# Patient Record
Sex: MALE | Race: WHITE | Employment: FULL TIME | URBAN - METROPOLITAN AREA
[De-identification: names, ages, dates, MRNs, and addresses within clinical notes are randomized per-mention and may not be internally consistent; named-entity substitution may affect disease eponyms.]

---

## 2020-05-11 ENCOUNTER — APPOINTMENT (OUTPATIENT)
Dept: GENERAL RADIOLOGY | Age: 50
End: 2020-05-11
Payer: MEDICAID

## 2020-05-11 ENCOUNTER — HOSPITAL ENCOUNTER (OUTPATIENT)
Age: 50
Setting detail: OBSERVATION
Discharge: HOME | End: 2020-05-12
Attending: EMERGENCY MEDICINE | Admitting: SURGERY
Payer: MEDICAID

## 2020-05-11 ENCOUNTER — APPOINTMENT (OUTPATIENT)
Dept: CT IMAGING | Age: 50
End: 2020-05-11
Payer: MEDICAID

## 2020-05-11 PROBLEM — S22.42XA CLOSED FRACTURE OF THREE RIBS, LEFT, INITIAL ENCOUNTER: Status: ACTIVE | Noted: 2020-05-11

## 2020-05-11 LAB
ABO/RH: NORMAL
ACETAMINOPHEN LEVEL: <5 MCG/ML (ref 10–30)
ALBUMIN SERPL-MCNC: 4.1 G/DL (ref 3.5–5.2)
ALP BLD-CCNC: 70 U/L (ref 40–129)
ALT SERPL-CCNC: 37 U/L (ref 0–40)
ANION GAP SERPL CALCULATED.3IONS-SCNC: 13 MMOL/L (ref 7–16)
ANTIBODY SCREEN: NORMAL
APTT: 28.9 SEC (ref 24.5–35.1)
AST SERPL-CCNC: 31 U/L (ref 0–39)
B.E.: -2.7 MMOL/L (ref -3–3)
BILIRUB SERPL-MCNC: 0.8 MG/DL (ref 0–1.2)
BUN BLDV-MCNC: 16 MG/DL (ref 6–20)
CALCIUM SERPL-MCNC: 9.6 MG/DL (ref 8.6–10.2)
CHLORIDE BLD-SCNC: 104 MMOL/L (ref 98–107)
CO2: 24 MMOL/L (ref 22–29)
COHB: 1 % (ref 0–1.5)
CREAT SERPL-MCNC: 1 MG/DL (ref 0.7–1.2)
CRITICAL: ABNORMAL
DATE ANALYZED: ABNORMAL
DATE OF COLLECTION: ABNORMAL
ETHANOL: <10 MG/DL (ref 0–0.08)
GFR AFRICAN AMERICAN: >60
GFR NON-AFRICAN AMERICAN: >60 ML/MIN/1.73
GLUCOSE BLD-MCNC: 125 MG/DL (ref 74–99)
HCO3: 23 MMOL/L (ref 22–26)
HCT VFR BLD CALC: 43.2 % (ref 37–54)
HEMOGLOBIN: 14.8 G/DL (ref 12.5–16.5)
HHB: 0.6 % (ref 0–5)
INR BLD: 1
LAB: ABNORMAL
LACTIC ACID: 1.3 MMOL/L (ref 0.5–2.2)
Lab: ABNORMAL
MCH RBC QN AUTO: 31.8 PG (ref 26–35)
MCHC RBC AUTO-ENTMCNC: 34.3 % (ref 32–34.5)
MCV RBC AUTO: 92.7 FL (ref 80–99.9)
METHB: 0.3 % (ref 0–1.5)
MODE: ABNORMAL
O2 CONTENT: 22.2 ML/DL
O2 SATURATION: 99.4 % (ref 92–98.5)
O2HB: 98.1 % (ref 94–97)
OPERATOR ID: 925
PATIENT TEMP: 37 C
PCO2: 42.9 MMHG (ref 35–45)
PDW BLD-RTO: 12.1 FL (ref 11.5–15)
PH BLOOD GAS: 7.35 (ref 7.35–7.45)
PLATELET # BLD: 243 E9/L (ref 130–450)
PMV BLD AUTO: 9.8 FL (ref 7–12)
PO2: 306.8 MMHG (ref 75–100)
POTASSIUM SERPL-SCNC: 4.12 MMOL/L (ref 3.5–5)
POTASSIUM SERPL-SCNC: 4.3 MMOL/L (ref 3.5–5)
PROTHROMBIN TIME: 11.1 SEC (ref 9.3–12.4)
RBC # BLD: 4.66 E12/L (ref 3.8–5.8)
SALICYLATE, SERUM: <0.3 MG/DL (ref 0–30)
SODIUM BLD-SCNC: 141 MMOL/L (ref 132–146)
SOURCE, BLOOD GAS: ABNORMAL
THB: 15.6 G/DL (ref 11.5–16.5)
TIME ANALYZED: 1318
TOTAL PROTEIN: 6.8 G/DL (ref 6.4–8.3)
TRICYCLIC ANTIDEPRESSANTS SCREEN SERUM: NEGATIVE NG/ML
WBC # BLD: 7.2 E9/L (ref 4.5–11.5)

## 2020-05-11 PROCEDURE — 6360000004 HC RX CONTRAST MEDICATION: Performed by: RADIOLOGY

## 2020-05-11 PROCEDURE — G0378 HOSPITAL OBSERVATION PER HR: HCPCS

## 2020-05-11 PROCEDURE — 2580000003 HC RX 258: Performed by: STUDENT IN AN ORGANIZED HEALTH CARE EDUCATION/TRAINING PROGRAM

## 2020-05-11 PROCEDURE — 85730 THROMBOPLASTIN TIME PARTIAL: CPT

## 2020-05-11 PROCEDURE — G0390 TRAUMA RESPONS W/HOSP CRITI: HCPCS

## 2020-05-11 PROCEDURE — 6360000002 HC RX W HCPCS: Performed by: STUDENT IN AN ORGANIZED HEALTH CARE EDUCATION/TRAINING PROGRAM

## 2020-05-11 PROCEDURE — 70496 CT ANGIOGRAPHY HEAD: CPT

## 2020-05-11 PROCEDURE — 85027 COMPLETE CBC AUTOMATED: CPT

## 2020-05-11 PROCEDURE — 86900 BLOOD TYPING SEROLOGIC ABO: CPT

## 2020-05-11 PROCEDURE — 80307 DRUG TEST PRSMV CHEM ANLYZR: CPT

## 2020-05-11 PROCEDURE — 2580000003 HC RX 258: Performed by: RADIOLOGY

## 2020-05-11 PROCEDURE — 80053 COMPREHEN METABOLIC PANEL: CPT

## 2020-05-11 PROCEDURE — 71045 X-RAY EXAM CHEST 1 VIEW: CPT

## 2020-05-11 PROCEDURE — 83605 ASSAY OF LACTIC ACID: CPT

## 2020-05-11 PROCEDURE — G0480 DRUG TEST DEF 1-7 CLASSES: HCPCS

## 2020-05-11 PROCEDURE — 36415 COLL VENOUS BLD VENIPUNCTURE: CPT

## 2020-05-11 PROCEDURE — 82805 BLOOD GASES W/O2 SATURATION: CPT

## 2020-05-11 PROCEDURE — 70450 CT HEAD/BRAIN W/O DYE: CPT

## 2020-05-11 PROCEDURE — 6370000000 HC RX 637 (ALT 250 FOR IP): Performed by: STUDENT IN AN ORGANIZED HEALTH CARE EDUCATION/TRAINING PROGRAM

## 2020-05-11 PROCEDURE — 86850 RBC ANTIBODY SCREEN: CPT

## 2020-05-11 PROCEDURE — 74177 CT ABD & PELVIS W/CONTRAST: CPT

## 2020-05-11 PROCEDURE — 71260 CT THORAX DX C+: CPT

## 2020-05-11 PROCEDURE — 86901 BLOOD TYPING SEROLOGIC RH(D): CPT

## 2020-05-11 PROCEDURE — 6360000002 HC RX W HCPCS: Performed by: SURGERY

## 2020-05-11 PROCEDURE — 85610 PROTHROMBIN TIME: CPT

## 2020-05-11 PROCEDURE — 96375 TX/PRO/DX INJ NEW DRUG ADDON: CPT

## 2020-05-11 PROCEDURE — 99219 PR INITIAL OBSERVATION CARE/DAY 50 MINUTES: CPT | Performed by: SURGERY

## 2020-05-11 PROCEDURE — 96376 TX/PRO/DX INJ SAME DRUG ADON: CPT

## 2020-05-11 PROCEDURE — 73030 X-RAY EXAM OF SHOULDER: CPT

## 2020-05-11 PROCEDURE — 72125 CT NECK SPINE W/O DYE: CPT

## 2020-05-11 PROCEDURE — 96374 THER/PROPH/DIAG INJ IV PUSH: CPT

## 2020-05-11 PROCEDURE — 6810039000 HC L1 TRAUMA ALERT

## 2020-05-11 PROCEDURE — 70498 CT ANGIOGRAPHY NECK: CPT

## 2020-05-11 PROCEDURE — 6370000000 HC RX 637 (ALT 250 FOR IP): Performed by: SURGERY

## 2020-05-11 PROCEDURE — 99291 CRITICAL CARE FIRST HOUR: CPT

## 2020-05-11 PROCEDURE — 72170 X-RAY EXAM OF PELVIS: CPT

## 2020-05-11 PROCEDURE — 84132 ASSAY OF SERUM POTASSIUM: CPT

## 2020-05-11 RX ORDER — PROMETHAZINE HYDROCHLORIDE 25 MG/1
12.5 TABLET ORAL EVERY 6 HOURS PRN
Status: DISCONTINUED | OUTPATIENT
Start: 2020-05-11 | End: 2020-05-12 | Stop reason: HOSPADM

## 2020-05-11 RX ORDER — KETOROLAC TROMETHAMINE 30 MG/ML
15 INJECTION, SOLUTION INTRAMUSCULAR; INTRAVENOUS EVERY 6 HOURS
Status: DISCONTINUED | OUTPATIENT
Start: 2020-05-11 | End: 2020-05-12 | Stop reason: HOSPADM

## 2020-05-11 RX ORDER — SODIUM CHLORIDE 0.9 % (FLUSH) 0.9 %
10 SYRINGE (ML) INJECTION PRN
Status: DISCONTINUED | OUTPATIENT
Start: 2020-05-11 | End: 2020-05-12 | Stop reason: HOSPADM

## 2020-05-11 RX ORDER — SODIUM CHLORIDE 9 MG/ML
INJECTION, SOLUTION INTRAVENOUS CONTINUOUS
Status: DISCONTINUED | OUTPATIENT
Start: 2020-05-11 | End: 2020-05-12

## 2020-05-11 RX ORDER — SODIUM CHLORIDE 0.9 % (FLUSH) 0.9 %
10 SYRINGE (ML) INJECTION EVERY 12 HOURS SCHEDULED
Status: DISCONTINUED | OUTPATIENT
Start: 2020-05-11 | End: 2020-05-12 | Stop reason: HOSPADM

## 2020-05-11 RX ORDER — POLYETHYLENE GLYCOL 3350 17 G/17G
17 POWDER, FOR SOLUTION ORAL DAILY
Status: DISCONTINUED | OUTPATIENT
Start: 2020-05-11 | End: 2020-05-12 | Stop reason: HOSPADM

## 2020-05-11 RX ORDER — LIDOCAINE 4 G/G
1 PATCH TOPICAL DAILY
Status: DISCONTINUED | OUTPATIENT
Start: 2020-05-11 | End: 2020-05-12 | Stop reason: HOSPADM

## 2020-05-11 RX ORDER — METHOCARBAMOL 500 MG/1
1500 TABLET, FILM COATED ORAL 4 TIMES DAILY
Status: DISCONTINUED | OUTPATIENT
Start: 2020-05-11 | End: 2020-05-12 | Stop reason: HOSPADM

## 2020-05-11 RX ORDER — ACETAMINOPHEN 325 MG/1
650 TABLET ORAL EVERY 6 HOURS
Status: DISCONTINUED | OUTPATIENT
Start: 2020-05-11 | End: 2020-05-12 | Stop reason: HOSPADM

## 2020-05-11 RX ORDER — SODIUM CHLORIDE 0.9 % (FLUSH) 0.9 %
10 SYRINGE (ML) INJECTION ONCE
Status: COMPLETED | OUTPATIENT
Start: 2020-05-11 | End: 2020-05-11

## 2020-05-11 RX ORDER — MORPHINE SULFATE 2 MG/ML
2 INJECTION, SOLUTION INTRAMUSCULAR; INTRAVENOUS
Status: DISCONTINUED | OUTPATIENT
Start: 2020-05-11 | End: 2020-05-11

## 2020-05-11 RX ORDER — OXYCODONE HYDROCHLORIDE 5 MG/1
5 TABLET ORAL EVERY 6 HOURS PRN
Status: DISCONTINUED | OUTPATIENT
Start: 2020-05-11 | End: 2020-05-12 | Stop reason: HOSPADM

## 2020-05-11 RX ORDER — ONDANSETRON 2 MG/ML
4 INJECTION INTRAMUSCULAR; INTRAVENOUS EVERY 6 HOURS PRN
Status: DISCONTINUED | OUTPATIENT
Start: 2020-05-11 | End: 2020-05-12 | Stop reason: HOSPADM

## 2020-05-11 RX ORDER — SENNA AND DOCUSATE SODIUM 50; 8.6 MG/1; MG/1
1 TABLET, FILM COATED ORAL 2 TIMES DAILY
Status: DISCONTINUED | OUTPATIENT
Start: 2020-05-11 | End: 2020-05-12 | Stop reason: HOSPADM

## 2020-05-11 RX ORDER — FENTANYL CITRATE 50 UG/ML
INJECTION, SOLUTION INTRAMUSCULAR; INTRAVENOUS DAILY PRN
Status: COMPLETED | OUTPATIENT
Start: 2020-05-11 | End: 2020-05-11

## 2020-05-11 RX ORDER — ONDANSETRON 2 MG/ML
4 INJECTION INTRAMUSCULAR; INTRAVENOUS EVERY 6 HOURS PRN
Status: DISCONTINUED | OUTPATIENT
Start: 2020-05-11 | End: 2020-05-11 | Stop reason: HOSPADM

## 2020-05-11 RX ORDER — ACETAMINOPHEN 325 MG/1
650 TABLET ORAL EVERY 4 HOURS PRN
Status: DISCONTINUED | OUTPATIENT
Start: 2020-05-11 | End: 2020-05-12 | Stop reason: HOSPADM

## 2020-05-11 RX ORDER — SODIUM CHLORIDE 0.9 % (FLUSH) 0.9 %
10 SYRINGE (ML) INJECTION ONCE
Status: DISCONTINUED | OUTPATIENT
Start: 2020-05-11 | End: 2020-05-11 | Stop reason: ALTCHOICE

## 2020-05-11 RX ADMIN — ACETAMINOPHEN 650 MG: 325 TABLET, FILM COATED ORAL at 14:01

## 2020-05-11 RX ADMIN — OXYCODONE 5 MG: 5 TABLET ORAL at 15:19

## 2020-05-11 RX ADMIN — FENTANYL CITRATE 50 MCG: 50 INJECTION, SOLUTION INTRAMUSCULAR; INTRAVENOUS at 13:16

## 2020-05-11 RX ADMIN — SODIUM CHLORIDE: 9 INJECTION, SOLUTION INTRAVENOUS at 19:14

## 2020-05-11 RX ADMIN — IOPAMIDOL 70 ML: 755 INJECTION, SOLUTION INTRAVENOUS at 15:56

## 2020-05-11 RX ADMIN — SODIUM CHLORIDE: 9 INJECTION, SOLUTION INTRAVENOUS at 14:00

## 2020-05-11 RX ADMIN — METHOCARBAMOL TABLETS 1500 MG: 500 TABLET, COATED ORAL at 20:01

## 2020-05-11 RX ADMIN — ACETAMINOPHEN 650 MG: 325 TABLET, FILM COATED ORAL at 20:01

## 2020-05-11 RX ADMIN — KETOROLAC TROMETHAMINE 15 MG: 30 INJECTION, SOLUTION INTRAMUSCULAR; INTRAVENOUS at 14:00

## 2020-05-11 RX ADMIN — Medication 10 ML: at 15:57

## 2020-05-11 RX ADMIN — METHOCARBAMOL TABLETS 1500 MG: 500 TABLET, COATED ORAL at 15:20

## 2020-05-11 RX ADMIN — IOPAMIDOL 100 ML: 755 INJECTION, SOLUTION INTRAVENOUS at 13:46

## 2020-05-11 RX ADMIN — KETOROLAC TROMETHAMINE 15 MG: 30 INJECTION, SOLUTION INTRAMUSCULAR; INTRAVENOUS at 19:14

## 2020-05-11 ASSESSMENT — PAIN DESCRIPTION - LOCATION
LOCATION: RIB CAGE
LOCATION: RIB CAGE

## 2020-05-11 ASSESSMENT — PAIN SCALES - GENERAL
PAINLEVEL_OUTOF10: 8
PAINLEVEL_OUTOF10: 10
PAINLEVEL_OUTOF10: 6
PAINLEVEL_OUTOF10: 5
PAINLEVEL_OUTOF10: 0

## 2020-05-11 ASSESSMENT — PAIN DESCRIPTION - PAIN TYPE
TYPE: ACUTE PAIN
TYPE: ACUTE PAIN

## 2020-05-11 ASSESSMENT — PAIN DESCRIPTION - DESCRIPTORS
DESCRIPTORS: ACHING;DISCOMFORT;SHARP
DESCRIPTORS: ACHING;DISCOMFORT;SHARP

## 2020-05-11 ASSESSMENT — PAIN DESCRIPTION - FREQUENCY
FREQUENCY: CONTINUOUS
FREQUENCY: CONTINUOUS

## 2020-05-11 ASSESSMENT — PAIN DESCRIPTION - ORIENTATION
ORIENTATION: LEFT
ORIENTATION: LEFT

## 2020-05-11 ASSESSMENT — PAIN DESCRIPTION - ONSET
ONSET: ON-GOING
ONSET: ON-GOING

## 2020-05-11 NOTE — ED PROVIDER NOTES
Department of Emergency Medicine   ED  Provider Note  Admit Date/RoomTime: 5/11/2020  1:07 PM  ED Room: 03/03          History of Present Illness:  5/11/20, Time: 1:23 PM EDT  Chief Complaint   Patient presents with   Robbie Trimble is a 44 y.o. male presenting to the ED for trauma. Patient states he was cutting a tree down when it slipped and struck him. He did lose consciousness. He does complain of left chest wall pain. EMS reports no breath sounds on the left in route. She is been hemodynamically stable. He is on no anticoagulation, GCS has been 15. There was 3 minutes of loss of consciousness. Denies neck pain, back pain, pain in the extremities, nausea, vomit, blurred vision, or any other symptoms. Review of Systems:   Pertinent positives and negatives are stated within HPI, all other systems reviewed and are negative.        --------------------------------------------- PAST HISTORY ---------------------------------------------  Past Medical History:  has no past medical history on file. Past Surgical History:  has no past surgical history on file. Social History:      Family History: family history is not on file. . Unless otherwise noted, family history is non contributory    The patients home medications have been reviewed. Allergies: Patient has no known allergies. ---------------------------------------------------PHYSICAL EXAM--------------------------------------    Constitutional/General: Alert and oriented x3  Head: Normocephalic and atraumatic  Eyes: PERRL, EOMI, sclera non icteric  Mouth: Oropharynx clear, handling secretions, no trismus, no asymmetry of the posterior oropharynx or uvular edema  Neck: C-collar intact  Respiratory: Lungs clear to auscultation bilaterally, no wheezes, rales, or rhonchi. Not in respiratory distress  Cardiovascular:  Regular rate. Regular rhythm. 2+ distal pulses. Equal extremity pulses.    Chest: Left chest wall ------------------------- NURSING NOTES AND VITALS REVIEWED ---------------------------   The nursing notes within the ED encounter and vital signs as below have been reviewed by myself  BP (!) 146/92   Pulse 68   Temp 97.9 °F (36.6 °C)   Resp 14   Ht 6' 2\" (1.88 m)   Wt 240 lb (108.9 kg)   SpO2 99%   BMI 30.81 kg/m²     Oxygen Saturation Interpretation: Normal    The patients available past medical records and past encounters were reviewed. ------------------------------ ED COURSE/MEDICAL DECISION MAKING----------------------  Medications   0.9 % sodium chloride infusion (has no administration in time range)   morphine (PF) injection 2 mg (has no administration in time range)   ondansetron (ZOFRAN) injection 4 mg (has no administration in time range)   fentaNYL (SUBLIMAZE) injection (50 mcg Intravenous Given 5/11/20 1316)           The cardiac monitor revealed sinus with a heart rate in the 60s as interpreted by me. The cardiac monitor was ordered secondary to the patient's trauma and to monitor the patient for dysrhythmia. [facility] [id number]         Medical Decision Making:    Patient presents as a trauma. ATLS protocol initiated. Chest x-ray and pelvis x-ray reviewed. Patient remained hemodynamically stable in the trauma bay. Trauma service bedside, further treatment and evaluation will be transferred to the trauma service. Critical Care Time: 30 minutes      Counseling: The emergency provider has spoken with the patient and discussed todays results, in addition to providing specific details for the plan of care and counseling regarding the diagnosis and prognosis. Questions are answered at this time and they are agreeable with the plan.       --------------------------------- IMPRESSION AND DISPOSITION ---------------------------------    IMPRESSION  1. Blunt trauma    2. Closed head injury, initial encounter    3.  LOC (loss of consciousness) ([facility] [address]        DISPOSITION  Disposition:

## 2020-05-11 NOTE — ED NOTES
Spoke to Awilda pts sister to update on pts status, per pr request.      Dana Streeter, RN  05/11/20 4700

## 2020-05-12 VITALS
HEART RATE: 61 BPM | WEIGHT: 240 LBS | OXYGEN SATURATION: 94 % | RESPIRATION RATE: 18 BRPM | HEIGHT: 74 IN | DIASTOLIC BLOOD PRESSURE: 73 MMHG | TEMPERATURE: 97.4 F | SYSTOLIC BLOOD PRESSURE: 124 MMHG | BODY MASS INDEX: 30.8 KG/M2

## 2020-05-12 PROBLEM — S05.8X2A COMMOTIO RETINAE OF LEFT EYE: Status: ACTIVE | Noted: 2020-05-12

## 2020-05-12 PROBLEM — H54.62 DECREASED VISION OF LEFT EYE: Status: ACTIVE | Noted: 2020-05-12

## 2020-05-12 PROBLEM — H35.81 MACULAR RETINAL EDEMA: Status: ACTIVE | Noted: 2020-05-12

## 2020-05-12 PROCEDURE — 96376 TX/PRO/DX INJ SAME DRUG ADON: CPT

## 2020-05-12 PROCEDURE — G0378 HOSPITAL OBSERVATION PER HR: HCPCS

## 2020-05-12 PROCEDURE — 2580000003 HC RX 258: Performed by: SURGERY

## 2020-05-12 PROCEDURE — 96372 THER/PROPH/DIAG INJ SC/IM: CPT

## 2020-05-12 PROCEDURE — 6360000002 HC RX W HCPCS: Performed by: SURGERY

## 2020-05-12 PROCEDURE — 6370000000 HC RX 637 (ALT 250 FOR IP): Performed by: SURGERY

## 2020-05-12 PROCEDURE — 97165 OT EVAL LOW COMPLEX 30 MIN: CPT

## 2020-05-12 PROCEDURE — 2700000000 HC OXYGEN THERAPY PER DAY

## 2020-05-12 PROCEDURE — 99225 PR SBSQ OBSERVATION CARE/DAY 25 MINUTES: CPT | Performed by: SURGERY

## 2020-05-12 RX ORDER — LIDOCAINE 4 G/G
1 PATCH TOPICAL DAILY
Qty: 1 BOX | Refills: 0 | Status: SHIPPED | OUTPATIENT
Start: 2020-05-12

## 2020-05-12 RX ORDER — IBUPROFEN 600 MG/1
600 TABLET ORAL 3 TIMES DAILY PRN
Qty: 30 TABLET | Refills: 0 | Status: SHIPPED | OUTPATIENT
Start: 2020-05-12

## 2020-05-12 RX ORDER — METHOCARBAMOL 750 MG/1
1500 TABLET, FILM COATED ORAL 4 TIMES DAILY
Qty: 80 TABLET | Refills: 0 | Status: SHIPPED | OUTPATIENT
Start: 2020-05-12 | End: 2020-05-22

## 2020-05-12 RX ORDER — OXYCODONE HYDROCHLORIDE AND ACETAMINOPHEN 5; 325 MG/1; MG/1
1 TABLET ORAL EVERY 6 HOURS PRN
Qty: 20 TABLET | Refills: 0 | Status: SHIPPED | OUTPATIENT
Start: 2020-05-12 | End: 2020-05-17

## 2020-05-12 RX ADMIN — KETOROLAC TROMETHAMINE 15 MG: 30 INJECTION, SOLUTION INTRAMUSCULAR; INTRAVENOUS at 02:59

## 2020-05-12 RX ADMIN — ENOXAPARIN SODIUM 30 MG: 30 INJECTION SUBCUTANEOUS at 08:25

## 2020-05-12 RX ADMIN — KETOROLAC TROMETHAMINE 15 MG: 30 INJECTION, SOLUTION INTRAMUSCULAR; INTRAVENOUS at 08:26

## 2020-05-12 RX ADMIN — ACETAMINOPHEN 650 MG: 325 TABLET, FILM COATED ORAL at 08:26

## 2020-05-12 RX ADMIN — ACETAMINOPHEN 650 MG: 325 TABLET, FILM COATED ORAL at 03:00

## 2020-05-12 RX ADMIN — SODIUM CHLORIDE, PRESERVATIVE FREE 10 ML: 5 INJECTION INTRAVENOUS at 08:25

## 2020-05-12 RX ADMIN — METHOCARBAMOL TABLETS 1500 MG: 500 TABLET, COATED ORAL at 08:26

## 2020-05-12 RX ADMIN — METHOCARBAMOL TABLETS 1500 MG: 500 TABLET, COATED ORAL at 13:12

## 2020-05-12 RX ADMIN — SENNOSIDES AND DOCUSATE SODIUM 1 TABLET: 8.6; 5 TABLET ORAL at 08:26

## 2020-05-12 RX ADMIN — POLYETHYLENE GLYCOL 3350 17 G: 17 POWDER, FOR SOLUTION ORAL at 08:27

## 2020-05-12 RX ADMIN — OXYCODONE 5 MG: 5 TABLET ORAL at 03:00

## 2020-05-12 ASSESSMENT — PAIN DESCRIPTION - ORIENTATION: ORIENTATION: LEFT

## 2020-05-12 ASSESSMENT — PAIN SCALES - GENERAL
PAINLEVEL_OUTOF10: 5
PAINLEVEL_OUTOF10: 10
PAINLEVEL_OUTOF10: 0

## 2020-05-12 ASSESSMENT — PAIN DESCRIPTION - FREQUENCY: FREQUENCY: CONTINUOUS

## 2020-05-12 ASSESSMENT — PAIN DESCRIPTION - LOCATION: LOCATION: RIB CAGE

## 2020-05-12 ASSESSMENT — PAIN DESCRIPTION - PAIN TYPE: TYPE: ACUTE PAIN

## 2020-05-12 ASSESSMENT — PAIN DESCRIPTION - DESCRIPTORS: DESCRIPTORS: ACHING;DISCOMFORT;SORE

## 2020-05-12 ASSESSMENT — PAIN DESCRIPTION - ONSET: ONSET: ON-GOING

## 2020-05-12 NOTE — DISCHARGE SUMMARY
Physician Discharge Summary     Patient ID:  Aquilino Fernandez  [id number]  76 y.o.  1970    Admit date: 2020    Discharge date and time: 2020  2:18 PM     Admitting Physician: Joel Munoz MD     Admission Diagnoses: Closed fracture of three ribs, left, initial encounter [S22.42XA]    Discharge Diagnoses: Active Problems:    Closed fracture of three ribs, left, initial encounter    Commotio retinae of left eye    Macular retinal edema, left, traumatic    Decreased vision of left eye  Resolved Problems:    * No resolved hospital problems. *      Admission Condition: fair    Discharged Condition: stable    Indication for Admission: Trauma, Rib Fractures     Hospital Course/Procedures/Operation/treatments:   : Patient was a trauma consult after a tree fell on him found to have a left 8 through 10 rib fractures. He also had left I blurry vision/change in vision. Imaging found no BC VI. He was admitted to [facility] 5 floor with an ophthalmology consult. : Pain control with pain medications, SMI 2500, awaiting ophthalmology recommendations. Consults:   IP CONSULT TO OPHTHALMOLOGY    Significant Diagnostic Studies:   Xr Pelvis (1-2 Views)    Result Date: 2020  Patient MRN:  [id number] : 1970 Age: 44 years Gender: Male Order Date:  2020 1:15 PM EXAM: XR PELVIS (1-2 VIEWS) INDICATION:  trauma trauma COMPARISON: None FINDINGS:  There is no fracture or dislocation. There is some sclerosis about L5-S1. Hip joints appear symmetric and unremarkable. No acute process     Ct Head Wo Contrast    Result Date: 2020  Patient MRN:  [id number] : 1970 Age: 44 years Gender: Male Order Date:  2020 1:15 PM EXAM: CT HEAD WO CONTRAST NUMBER OF IMAGES:  157 INDICATION: Head trauma COMPARISON: None TECHNIQUE: Axial noncontrast images were extended through the head, and are reformatted in all 3 imaging planes. Images reviewed at soft tissue and bone window settings.  Low-dose CT acquisition technique included one of following options; 1 . Automated exposure control, 2. Adjustment of MA and or KV according to patient's size or 3. Use of iterative reconstruction. FINDINGS: Intracranial anatomy shows symmetry of cisterns, sulci, and ventricles, which are proportionate and symmetric. Gray-white matter differentiation is normal. There is no evidence of hemorrhage or mass effect, and no pathologic extra-axial fluid collections are noted. There is no evidence of skeletal trauma or sinus opacification, but there is a maxillary sinus mucous retention cyst on the right. . Extracranial soft tissues show no evidence of acute pathology. Mastoid air cells and middle ear cavities are normally aerated. Negative noncontrast CT study. Specifically, there is no evidence of intracranial hemorrhage or mass effect, and no calvarial traumatic findings are noted. Ct Chest W Contrast    Result Date: 2020  Patient MRN:  [id number] : 1970 Age: 44 years Gender: Male Order Date:  2020 1:30 PM EXAM: CT CHEST W CONTRAST INDICATION:  trauma. TECHNIQUE: Axial images from the lung apices to below the diaphragm with IV contrast. Axial, sagittal and coronal 2-D reconstructions in soft tissue windows. Axial lung windows. Dose reduction techniques were used. Contrast is 100 mL Isovue-370 IV. Dose is total  MG Y CM. COMPARISON: CT abdomen and pelvis done at the same time 2020. FINDINGS:  There is trace subpleural atelectasis along the posterior lung margins. Minimal hazy atelectasis at the inferior right lung base adjacent to the dome of the diaphragm. No evidence for pneumonia, CHF, pleural effusion or pneumothorax. Normal size and appearance of the central airways. No mediastinal emphysema. Heart size normal. No pericardial effusion. No evidence for central PE. No hiatal hernia. Normal size and appearance of the aorta, no aneurysm or dissection.  A 1.8 cm x 0.9 cm right paratracheal lymph node just above the ajit. Small subcarinal lymph nodes. No adenopathy. A less than 1 cm left hilar lymph node. No mediastinal hematoma. Normal thyroid. Normal esophagus. No hiatal hernia. No free fluid in the visualized upper abdomen. Bones: Subtle acute fractures of the lateral left eighth, ninth, 10th ribs. No pneumothorax or pleural effusion. No obvious pleural hematomas. No obvious right rib fractures. Sternum is intact. The thoracic spine shows several shallow inferior endplate Schmorl's node deformities which are likely chronic. Mild thoracic spurs. 1. Fractures of the lateral left eighth, ninth, 10th ribs. 2. Trace basilar atelectasis, no pneumothorax or effusion. 3. No aortic aneurysm or dissection. Ct Cervical Spine Wo Contrast    Result Date: 2020  Patient MRN:  [id number] : 1970 Age: 44 years Gender: Male Order Date:  2020 1:15 PM EXAM: CT CERVICAL SPINE WO CONTRAST NUMBER OF IMAGES \ views:  288 INDICATION: Trauma with neck pain COMPARISON: Other contemporaneous CT images are reported separately. TECHNIQUE: Axial images were extended through the cervical spine without intravenous contrast, and reformatted multiplanar images were provided for review in soft tissue and bone window settings. Low-dose CT acquisition technique included one of following options; 1 . Automated exposure control, 2. Adjustment of MA and or KV according to patient's size or 3. Use of iterative reconstruction. FINDINGS: Bony structures are intact with relative preservation of alignment and joint spacing throughout. There is chronic fusion of the C2-3 and C6-7 levels. There is a slight loss of cervical lordosis with hypertrophic changes of vertebral articular margins. Individual vertebrae and facets show no evidence of acute traumatic disruption or bony displacement. There is a segmented appearance to the posterior C1 vertebrae, which is congenital variation. There is no paraspinous soft tissue swelling. home    In process/preliminary results:  Outstanding Order Results     No orders found from 4/12/2020 to 5/12/2020. Patient Instructions:   Discharge Medication List as of 5/12/2020  1:07 PM           Details   ibuprofen (ADVIL;MOTRIN) 600 MG tablet Take 1 tablet by mouth 3 times daily as needed for Pain, Disp-30 tablet, R-0Normal      lidocaine 4 % external patch Place 1 patch onto the skin daily, Transdermal, DAILY Starting Tue 5/12/2020, Disp-1 box, R-0, Normal      methocarbamol (ROBAXIN) 750 MG tablet Take 2 tablets by mouth 4 times daily for 10 days, Disp-80 tablet, R-0Normal      oxyCODONE-acetaminophen (PERCOCET) 5-325 MG per tablet Take 1 tablet by mouth every 6 hours as needed for Pain for up to 5 days. Intended supply: 5 days. Take lowest dose possible to manage pain, Disp-20 tablet, R-0Normal                     [facility]  Additional Discharge Instructions    Call [phone] for any questions/concerns and for follow up appointment in 1 week. Please follow the instructions checked below:           ACTIVITY INSTRUCTIONS:  [x]Increase activity as tolerated    []Elevate affected/operative limb   [x]No heavy lifting or strenuous activity     [x]No driving while taking pain medication  [x]Cough and deep breath 4 - 6 times a hour  [x]Incentive Spirometer 4 - 6 times a hour  []Other     WOUND/DRESSING INSTRUCTIONS:  [x]May shower               MEDICATION INSTRUCTIONS:  [x]Take medication as prescribed. []When taking pain medications, you may experience dizziness or drowsiness. Do not drink alcohol or drive when taking these medications. []You may experience constipation while taking pain medication - You may take over the counter stool softeners: docuscate (Colace) or sennosides S (Senokot - S).      WORK:  [x]You may not return to work until cleared by [facility]    Call physician for any of the following or for questions/concerns:   · Fever over 101°

## 2020-05-12 NOTE — PROGRESS NOTES
CLINICAL PHARMACY NOTE: MEDS TO [address] Select Patient?: No  Total # of Prescriptions Filled: 4   The following medications were delivered to the patient:  · PERCOCET 5/325 MG   · ROBAXIN 750 MG   · IBUPROFEN 600 MG   · LIDOCAINE 4 PATCH  Total # of Interventions Completed: 5  Time Spent (min): 30    Additional Documentation:
Discharge paper work went over with patient.  All questions answered
absent normal   Right hand grasp absent absent normal   Left hand grasp absent absent normal   Right hip absent absent normal   Left hip absent absent normal   Right knee absent absent normal   Left knee absent absent normal   Right ankle absent absent normal   Left ankle absent absent normal   Right foot absent absent normal   Left foot absent absent normal       CONSULTS:   Ophthalmology    PROCEDURES:  none    INJURIES:  L ribs 8-10 fx, L shoulder pain, visual changes L eye      Active Problems:    * No active hospital problems. *  Resolved Problems:    * No resolved hospital problems. *        Assessment/Plan:       · Neuro:  GCS 15, ?concussion. Spines clear. CTA head/neck wnl. Multimodal pain control  · CV: HR near normal limits, no acute issues   · Pulm: tolerating room air. SMI 2500   · GI: tolerating general diet   · Renal: no acute issues   · ID: afebrile, no acute issues     · Endocrine: no acute issues,   · MSK: L shoulder pain, XR L shoulder   · Heme: no acute issues   · HEENT:  L eye visual changes, Ophtho consult     Bowel regime: colace, MOM   Pain control/Sedation: oxy, robaxin, toradol, tylenol, lido patches  DVT prophylaxis: SCDs, lovenox    GI: diet   Glucose protocol: n/a  Mouth/Eye care: per patient, .    Cunningham: none     Code status:    No Order    Patient/Family update:  As available     Disposition:    Admit for observation, pain control  Ophtho consult  L shoulder XR      Electronically signed by Evern Gosselin, DO on 5/11/20 at 5:34 PM EDT    Patient seen and examined I agree with above   CC: left chestwall pain   GEN NAD   HEENT: PERRL 3mm   Resp non labored clear   CVS RR   ABD SNT   EXT NVI   A/P s/p blunt trauma from tree with rib fx, pain controlled optho saw for visual field changes, FU with optho as out pt     OK d/c home     Fabian Harada MD
technique, precautions. Pt.  also Instructed RE: safe transfers/mobility, ADL training, role of OT, E.C. techniques, treatment plan, recs. , prec. Eval Complexity: Low    (Evaluation time includes thorough review of current medical information, gathering information on past medical history/social history and prior level of function, completion of standardized testing/informal observation of tasks, assessment of data, consultation with other medical professions/disciplines, and development of POC/Goals.)      Assessment of current deficits   Functional mobility [x]  ADLs [x] Strength [x]  Cognition []  Functional transfers  [x] IADLs [x] Safety Awareness [x]  Endurance [x]  Fine Motor Coordination [] Balance [x] Vision/perception [] Sensation []   Gross Motor Coordination [] ROM [] Delirium []                  Motor Control []    Plan of Care: OT for 2-5 x/wk. ADL retraining [x]   Equipment needs [x]   Neuromuscular re-education [x] Energy Conservation Techniques [x]  Functional Transfer training [x] Patient and/or Family Education [x]  Functional Mobility training [x]  Environmental Modifications [x]  Cognitive re-training []   Compensatory techniques for ADLs [x]  Splinting Needs []   Positioning to improve overall function [x]   Therapeutic Activity [x]  Therapeutic Exercise  [x]  Visual/Perceptual: []    Delirium prevention/treatment  []   Other:  []    Rehab Potential: Good for established goals     Patient / Family Goal: to return home soon    Patient and/or family were instructed diagnosis, prognosis/goals and plan of care. Demonstrated fair+/good understanding. [] Malnutrition indicators have been identified and nursing has been notified to ensure a dietitian consult is ordered.        low Evaluation only     Treatment Time In:13:40            Treatment Time Out: 13:55               Treatment Charges: Mins Units   Ther Ex  [id number]     Manual Therapy [address] [employer] Activities [id number]    ADL/Home Mgt

## 2023-10-30 ENCOUNTER — NURSE TRIAGE (OUTPATIENT)
Dept: OTHER | Facility: CLINIC | Age: 53
End: 2023-10-30

## 2023-10-30 NOTE — TELEPHONE ENCOUNTER
Location of patient: [address] call from JOSE  at [employer] with [employer]. Subjective: Caller states needs to establish with PCP   Difficulty breathing cough  pain in chest     Current Symptoms: cough wakes up from sleep,productive mucus grey/white, pain all the time in the upper chest in the middle   Short of breath when going to bed , burning in chest from cough     Onset: 1 week     Associated Symptoms: NA    Pain Severity: upper chest after coughing, at present pain 6/10 upper chest     Temperature: Denies     What has been tried:     Recommended disposition: Go to ED Now    Care advice provided, patient verbalizes understanding; denies any other questions or concerns; instructed to call back for any new or worsening symptoms. Patient/caller agrees to proceed to the Emergency Department    Attention Provider: Thank you for allowing me to participate in the care of your patient. The patient was connected to triage in response to information provided to the ECC/PSC. Please do not respond through this encounter as the response is not directed to a shared pool.        Reason for Disposition   Difficulty breathing    Protocols used: Chest Pain-ADULT-[place]

## 2025-04-18 ENCOUNTER — APPOINTMENT (OUTPATIENT)
Dept: GENERAL RADIOLOGY | Age: 55
DRG: 090 | End: 2025-04-18
Payer: COMMERCIAL

## 2025-04-18 ENCOUNTER — APPOINTMENT (OUTPATIENT)
Dept: CT IMAGING | Age: 55
DRG: 090 | End: 2025-04-18
Payer: COMMERCIAL

## 2025-04-18 ENCOUNTER — HOSPITAL ENCOUNTER (INPATIENT)
Age: 55
LOS: 3 days | Discharge: HOME HEALTH | DRG: 090 | End: 2025-04-21
Attending: EMERGENCY MEDICINE | Admitting: SURGERY
Payer: COMMERCIAL

## 2025-04-18 DIAGNOSIS — S22.42XA: ICD-10-CM

## 2025-04-18 DIAGNOSIS — S06.0X9A CONCUSSION WITH LOSS OF CONSCIOUSNESS, INITIAL ENCOUNTER: ICD-10-CM

## 2025-04-18 DIAGNOSIS — M79.605 LEFT LEG PAIN: ICD-10-CM

## 2025-04-18 DIAGNOSIS — M25.551 RIGHT HIP PAIN: ICD-10-CM

## 2025-04-18 DIAGNOSIS — T14.90XA TRAUMA: Primary | ICD-10-CM

## 2025-04-18 PROBLEM — S80.12XA LEG HEMATOMA, LEFT, INITIAL ENCOUNTER: Status: ACTIVE | Noted: 2025-04-18

## 2025-04-18 LAB
ABO + RH BLD: NORMAL
ALBUMIN SERPL-MCNC: 3.9 G/DL (ref 3.5–5.2)
ALP SERPL-CCNC: 77 U/L (ref 40–129)
ALT SERPL-CCNC: 28 U/L (ref 0–50)
ANION GAP SERPL CALCULATED.3IONS-SCNC: 14 MMOL/L (ref 7–16)
APAP SERPL-MCNC: <5 UG/ML (ref 10–30)
ARM BAND NUMBER: NORMAL
AST SERPL-CCNC: 36 U/L (ref 0–50)
B.E.: -3.4 MMOL/L (ref -3–3)
BILIRUB SERPL-MCNC: 0.5 MG/DL (ref 0–1.2)
BLOOD BANK SAMPLE EXPIRATION: NORMAL
BLOOD GROUP ANTIBODIES SERPL: NEGATIVE
BUN SERPL-MCNC: 10 MG/DL (ref 6–20)
CALCIUM SERPL-MCNC: 9.1 MG/DL (ref 8.6–10)
CHLORIDE SERPL-SCNC: 108 MMOL/L (ref 98–107)
CLOT ANGLE.KAOLIN INDUCED BLD RES TEG: 66.1 DEG (ref 53–70)
CO2 SERPL-SCNC: 21 MMOL/L (ref 22–29)
COHB: 0.1 % (ref 0–1.5)
CREAT SERPL-MCNC: 1.4 MG/DL (ref 0.7–1.2)
CRITICAL: ABNORMAL
DATE ANALYZED: ABNORMAL
DATE OF COLLECTION: ABNORMAL
EPL-TEG: 0.3 % (ref 0–15)
ERYTHROCYTE [DISTWIDTH] IN BLOOD BY AUTOMATED COUNT: 12.4 % (ref 11.5–15)
ETHANOLAMINE SERPL-MCNC: 78 MG/DL (ref 0–0.08)
G-TEG: 11.2 KDYN/CM2 (ref 4.5–11)
GFR, ESTIMATED: 58 ML/MIN/1.73M2
GLUCOSE SERPL-MCNC: 105 MG/DL (ref 74–99)
HCO3: 20.4 MMOL/L (ref 22–26)
HCT VFR BLD AUTO: 43.7 % (ref 37–54)
HGB BLD-MCNC: 15.4 G/DL (ref 12.5–16.5)
HHB: 0.6 % (ref 0–5)
INR PPP: 1
KINETICS TEG: 1.7 MIN (ref 1–3)
LAB: ABNORMAL
LACTATE BLDV-SCNC: 2.8 MMOL/L (ref 0.5–2.2)
LY30 (LYSIS) TEG: 0.3 % (ref 0–8)
Lab: 1328
MA (MAX CLOT) TEG: 69.1 MM (ref 50–70)
MCH RBC QN AUTO: 32 PG (ref 26–35)
MCHC RBC AUTO-ENTMCNC: 35.2 G/DL (ref 32–34.5)
MCV RBC AUTO: 90.7 FL (ref 80–99.9)
METHB: 0.6 % (ref 0–1.5)
MODE: ABNORMAL
O2 SATURATION: 99.4 % (ref 92–98.5)
O2HB: 98.7 % (ref 94–97)
OPERATOR ID: 882
PARTIAL THROMBOPLASTIN TIME: 30.6 SEC (ref 24.5–35.1)
PATIENT TEMP: 37 C
PCO2: 33.4 MMHG (ref 35–45)
PH BLOOD GAS: 7.4 (ref 7.35–7.45)
PLATELET # BLD AUTO: 285 K/UL (ref 130–450)
PMV BLD AUTO: 9.3 FL (ref 7–12)
PO2: 372.3 MMHG (ref 75–100)
POTASSIUM SERPL-SCNC: 3.8 MMOL/L (ref 3.5–5.1)
POTASSIUM SERPL-SCNC: 3.85 MMOL/L (ref 3.5–5)
PROT SERPL-MCNC: 6.6 G/DL (ref 6.4–8.3)
PROTHROMBIN TIME: 10.8 SEC (ref 9.3–12.4)
RBC # BLD AUTO: 4.82 M/UL (ref 3.8–5.8)
REACTION TIME TEG: 7.3 MIN (ref 5–10)
SALICYLATES SERPL-MCNC: <0.5 MG/DL (ref 0–30)
SODIUM SERPL-SCNC: 142 MMOL/L (ref 136–145)
SOURCE, BLOOD GAS: ABNORMAL
THB: 16.3 G/DL (ref 11.5–16.5)
TIME ANALYZED: 1332
TOXIC TRICYCLIC SC,BLOOD: NEGATIVE
WBC OTHER # BLD: 8 K/UL (ref 4.5–11.5)

## 2025-04-18 PROCEDURE — 2580000003 HC RX 258

## 2025-04-18 PROCEDURE — 6360000004 HC RX CONTRAST MEDICATION: Performed by: RADIOLOGY

## 2025-04-18 PROCEDURE — 72128 CT CHEST SPINE W/O DYE: CPT

## 2025-04-18 PROCEDURE — 85384 FIBRINOGEN ACTIVITY: CPT

## 2025-04-18 PROCEDURE — 73700 CT LOWER EXTREMITY W/O DYE: CPT

## 2025-04-18 PROCEDURE — 85730 THROMBOPLASTIN TIME PARTIAL: CPT

## 2025-04-18 PROCEDURE — 72131 CT LUMBAR SPINE W/O DYE: CPT

## 2025-04-18 PROCEDURE — 86900 BLOOD TYPING SEROLOGIC ABO: CPT

## 2025-04-18 PROCEDURE — 36600 WITHDRAWAL OF ARTERIAL BLOOD: CPT | Performed by: SURGERY

## 2025-04-18 PROCEDURE — 2500000003 HC RX 250 WO HCPCS

## 2025-04-18 PROCEDURE — 85347 COAGULATION TIME ACTIVATED: CPT

## 2025-04-18 PROCEDURE — 99223 1ST HOSP IP/OBS HIGH 75: CPT | Performed by: SURGERY

## 2025-04-18 PROCEDURE — G0480 DRUG TEST DEF 1-7 CLASSES: HCPCS

## 2025-04-18 PROCEDURE — 85027 COMPLETE CBC AUTOMATED: CPT

## 2025-04-18 PROCEDURE — 73080 X-RAY EXAM OF ELBOW: CPT

## 2025-04-18 PROCEDURE — 85390 FIBRINOLYSINS SCREEN I&R: CPT

## 2025-04-18 PROCEDURE — 80053 COMPREHEN METABOLIC PANEL: CPT

## 2025-04-18 PROCEDURE — 84132 ASSAY OF SERUM POTASSIUM: CPT

## 2025-04-18 PROCEDURE — 70496 CT ANGIOGRAPHY HEAD: CPT

## 2025-04-18 PROCEDURE — 94150 VITAL CAPACITY TEST: CPT

## 2025-04-18 PROCEDURE — 82805 BLOOD GASES W/O2 SATURATION: CPT

## 2025-04-18 PROCEDURE — 6370000000 HC RX 637 (ALT 250 FOR IP)

## 2025-04-18 PROCEDURE — 70450 CT HEAD/BRAIN W/O DYE: CPT

## 2025-04-18 PROCEDURE — 86850 RBC ANTIBODY SCREEN: CPT

## 2025-04-18 PROCEDURE — 80179 DRUG ASSAY SALICYLATE: CPT

## 2025-04-18 PROCEDURE — 99285 EMERGENCY DEPT VISIT HI MDM: CPT

## 2025-04-18 PROCEDURE — 83605 ASSAY OF LACTIC ACID: CPT

## 2025-04-18 PROCEDURE — 71045 X-RAY EXAM CHEST 1 VIEW: CPT

## 2025-04-18 PROCEDURE — 72170 X-RAY EXAM OF PELVIS: CPT

## 2025-04-18 PROCEDURE — 71260 CT THORAX DX C+: CPT

## 2025-04-18 PROCEDURE — 72125 CT NECK SPINE W/O DYE: CPT

## 2025-04-18 PROCEDURE — 73590 X-RAY EXAM OF LOWER LEG: CPT

## 2025-04-18 PROCEDURE — 86901 BLOOD TYPING SEROLOGIC RH(D): CPT

## 2025-04-18 PROCEDURE — 1200000000 HC SEMI PRIVATE

## 2025-04-18 PROCEDURE — 74177 CT ABD & PELVIS W/CONTRAST: CPT

## 2025-04-18 PROCEDURE — 80307 DRUG TEST PRSMV CHEM ANLYZR: CPT

## 2025-04-18 PROCEDURE — 85576 BLOOD PLATELET AGGREGATION: CPT

## 2025-04-18 PROCEDURE — 80143 DRUG ASSAY ACETAMINOPHEN: CPT

## 2025-04-18 PROCEDURE — 6360000002 HC RX W HCPCS: Performed by: SURGERY

## 2025-04-18 PROCEDURE — 85610 PROTHROMBIN TIME: CPT

## 2025-04-18 PROCEDURE — 70498 CT ANGIOGRAPHY NECK: CPT

## 2025-04-18 RX ORDER — IOPAMIDOL 755 MG/ML
75 INJECTION, SOLUTION INTRAVASCULAR
Status: COMPLETED | OUTPATIENT
Start: 2025-04-18 | End: 2025-04-18

## 2025-04-18 RX ORDER — SODIUM CHLORIDE 9 MG/ML
INJECTION, SOLUTION INTRAVENOUS PRN
Status: DISCONTINUED | OUTPATIENT
Start: 2025-04-18 | End: 2025-04-21 | Stop reason: HOSPADM

## 2025-04-18 RX ORDER — ACETAMINOPHEN 325 MG/1
650 TABLET ORAL EVERY 4 HOURS
Status: DISCONTINUED | OUTPATIENT
Start: 2025-04-18 | End: 2025-04-18 | Stop reason: SDUPTHER

## 2025-04-18 RX ORDER — IBUPROFEN 600 MG/1
600 TABLET, FILM COATED ORAL 3 TIMES DAILY PRN
Status: DISCONTINUED | OUTPATIENT
Start: 2025-04-18 | End: 2025-04-21 | Stop reason: HOSPADM

## 2025-04-18 RX ORDER — ONDANSETRON 4 MG/1
4 TABLET, ORALLY DISINTEGRATING ORAL EVERY 8 HOURS PRN
Status: DISCONTINUED | OUTPATIENT
Start: 2025-04-18 | End: 2025-04-21 | Stop reason: HOSPADM

## 2025-04-18 RX ORDER — METHOCARBAMOL 500 MG/1
1000 TABLET, FILM COATED ORAL 4 TIMES DAILY
Status: DISCONTINUED | OUTPATIENT
Start: 2025-04-18 | End: 2025-04-21 | Stop reason: HOSPADM

## 2025-04-18 RX ORDER — ALBUTEROL SULFATE 0.83 MG/ML
2.5 SOLUTION RESPIRATORY (INHALATION) EVERY 4 HOURS PRN
Status: DISCONTINUED | OUTPATIENT
Start: 2025-04-18 | End: 2025-04-21 | Stop reason: HOSPADM

## 2025-04-18 RX ORDER — SODIUM CHLORIDE 0.9 % (FLUSH) 0.9 %
10 SYRINGE (ML) INJECTION EVERY 12 HOURS SCHEDULED
Status: DISCONTINUED | OUTPATIENT
Start: 2025-04-18 | End: 2025-04-21 | Stop reason: HOSPADM

## 2025-04-18 RX ORDER — OXYCODONE HYDROCHLORIDE 10 MG/1
10 TABLET ORAL EVERY 4 HOURS PRN
Status: DISCONTINUED | OUTPATIENT
Start: 2025-04-18 | End: 2025-04-21 | Stop reason: HOSPADM

## 2025-04-18 RX ORDER — POLYETHYLENE GLYCOL 3350 17 G/17G
17 POWDER, FOR SOLUTION ORAL DAILY
Status: DISCONTINUED | OUTPATIENT
Start: 2025-04-19 | End: 2025-04-21 | Stop reason: HOSPADM

## 2025-04-18 RX ORDER — SENNA AND DOCUSATE SODIUM 50; 8.6 MG/1; MG/1
1 TABLET, FILM COATED ORAL 2 TIMES DAILY
Status: DISCONTINUED | OUTPATIENT
Start: 2025-04-19 | End: 2025-04-21 | Stop reason: HOSPADM

## 2025-04-18 RX ORDER — SODIUM CHLORIDE 0.9 % (FLUSH) 0.9 %
10 SYRINGE (ML) INJECTION
Status: DISCONTINUED | OUTPATIENT
Start: 2025-04-18 | End: 2025-04-21 | Stop reason: HOSPADM

## 2025-04-18 RX ORDER — SODIUM CHLORIDE 0.9 % (FLUSH) 0.9 %
10 SYRINGE (ML) INJECTION PRN
Status: DISCONTINUED | OUTPATIENT
Start: 2025-04-18 | End: 2025-04-21 | Stop reason: HOSPADM

## 2025-04-18 RX ORDER — LIDOCAINE 4 G/G
1 PATCH TOPICAL DAILY
Status: DISCONTINUED | OUTPATIENT
Start: 2025-04-18 | End: 2025-04-21 | Stop reason: HOSPADM

## 2025-04-18 RX ORDER — ONDANSETRON 2 MG/ML
4 INJECTION INTRAMUSCULAR; INTRAVENOUS EVERY 6 HOURS PRN
Status: DISCONTINUED | OUTPATIENT
Start: 2025-04-18 | End: 2025-04-21 | Stop reason: HOSPADM

## 2025-04-18 RX ORDER — LABETALOL HYDROCHLORIDE 5 MG/ML
10 INJECTION, SOLUTION INTRAVENOUS
Status: DISCONTINUED | OUTPATIENT
Start: 2025-04-18 | End: 2025-04-21 | Stop reason: HOSPADM

## 2025-04-18 RX ORDER — FENTANYL CITRATE 50 UG/ML
100 INJECTION, SOLUTION INTRAMUSCULAR; INTRAVENOUS
Status: DISCONTINUED | OUTPATIENT
Start: 2025-04-18 | End: 2025-04-19

## 2025-04-18 RX ORDER — ACETAMINOPHEN 500 MG
1000 TABLET ORAL EVERY 8 HOURS SCHEDULED
Status: DISCONTINUED | OUTPATIENT
Start: 2025-04-18 | End: 2025-04-21 | Stop reason: HOSPADM

## 2025-04-18 RX ORDER — ASPIRIN 325 MG
325 TABLET ORAL DAILY
Status: DISCONTINUED | OUTPATIENT
Start: 2025-04-18 | End: 2025-04-21 | Stop reason: HOSPADM

## 2025-04-18 RX ORDER — HYDRALAZINE HYDROCHLORIDE 20 MG/ML
10 INJECTION INTRAMUSCULAR; INTRAVENOUS
Status: DISCONTINUED | OUTPATIENT
Start: 2025-04-18 | End: 2025-04-21 | Stop reason: HOSPADM

## 2025-04-18 RX ORDER — OXYCODONE HYDROCHLORIDE 5 MG/1
5 TABLET ORAL EVERY 4 HOURS PRN
Status: DISCONTINUED | OUTPATIENT
Start: 2025-04-18 | End: 2025-04-21 | Stop reason: HOSPADM

## 2025-04-18 RX ORDER — SODIUM CHLORIDE 9 MG/ML
INJECTION, SOLUTION INTRAVENOUS CONTINUOUS
Status: DISCONTINUED | OUTPATIENT
Start: 2025-04-18 | End: 2025-04-19

## 2025-04-18 RX ADMIN — IOPAMIDOL 75 ML: 755 INJECTION, SOLUTION INTRAVENOUS at 19:05

## 2025-04-18 RX ADMIN — SODIUM CHLORIDE, PRESERVATIVE FREE 10 ML: 5 INJECTION INTRAVENOUS at 20:31

## 2025-04-18 RX ADMIN — FENTANYL CITRATE 100 MCG: 50 INJECTION INTRAMUSCULAR; INTRAVENOUS at 16:11

## 2025-04-18 RX ADMIN — SODIUM CHLORIDE: 0.9 INJECTION, SOLUTION INTRAVENOUS at 15:31

## 2025-04-18 RX ADMIN — ACETAMINOPHEN 1000 MG: 500 TABLET ORAL at 20:30

## 2025-04-18 RX ADMIN — METHOCARBAMOL 1000 MG: 500 TABLET ORAL at 20:28

## 2025-04-18 RX ADMIN — IOPAMIDOL 75 ML: 755 INJECTION, SOLUTION INTRAVENOUS at 14:06

## 2025-04-18 RX ADMIN — ACETAMINOPHEN 650 MG: 325 TABLET ORAL at 15:26

## 2025-04-18 RX ADMIN — SODIUM CHLORIDE, PRESERVATIVE FREE 10 ML: 5 INJECTION INTRAVENOUS at 20:32

## 2025-04-18 ASSESSMENT — PAIN DESCRIPTION - DESCRIPTORS
DESCRIPTORS: ACHING;THROBBING
DESCRIPTORS: SHARP
DESCRIPTORS: SHARP;STABBING

## 2025-04-18 ASSESSMENT — PAIN - FUNCTIONAL ASSESSMENT: PAIN_FUNCTIONAL_ASSESSMENT: PREVENTS OR INTERFERES SOME ACTIVE ACTIVITIES AND ADLS

## 2025-04-18 ASSESSMENT — PAIN SCALES - GENERAL
PAINLEVEL_OUTOF10: 8
PAINLEVEL_OUTOF10: 7
PAINLEVEL_OUTOF10: 0
PAINLEVEL_OUTOF10: 8

## 2025-04-18 ASSESSMENT — PAIN DESCRIPTION - LOCATION
LOCATION: GENERALIZED
LOCATION: GENERALIZED
LOCATION: BACK;LEG

## 2025-04-18 ASSESSMENT — PAIN DESCRIPTION - ORIENTATION: ORIENTATION: LEFT

## 2025-04-18 NOTE — H&P
tetanus: within 5 years     Complaints:   Head:  None  Neck:   None  Chest:   None  Back:   None  Abdomen:   None  Extremities:   ModeratevR hip pain and left lower leg pain; B/l elbow pain; left ankle pain  Comments: Pain on hips    SECONDARY SURVEY    Physical Exam    Head/scalp: Atraumatic      Face: Atraumatic    Eyes/ears/nose: Atraumatic    Pharynx/mouth: Atraumatic    Neck:  Atraumatic    Cervical immobilization device:   Cervical collar placed on patient at time of arrival  Cervical spine tenderness:  None  Pain:  none  ROM:  Not indicated     Chest wall:   Atraumatic    SMI 2500 mL.      Heart:  Tachycardic regular rhythm    Abdomen: Atraumatic   Distention:   None  Tenderness:  None    Pelvis:    Tenderness to palpation        Thoracolumbar spine:   Atraumatic     Tenderness:   tenderness to palpation in lower thoracic and lumbar region    Genitourinary:   Atraumatic .    Blood:  None  Urine:  None    Rectum:  Atraumatic     Blood:   None  Rectal tone:  not applicable    Extremities:   Right upper extremity:  Atraumatic    Pulses:  normal  Capillary refill:  normal   Sensory Pain at elbow  Motor normal    Left upper extremity:  Atraumatic    Pulses:  normal  Capillary refill:  normal   Sensory Pain at elbow  Motor normal    Right lower extremity:      abrasion left knee   Pulses:  normal  Capillary refill:  normal   Sensory normal  Motor normal    Left lower extremity: deforminty on left lateral lower leg    Pulses:  normal  Capillary refill:  normal   Sensory normal  Motor: decreased motor on plantar flexion, weakness    Procedures in ED:  Femoral arterial puncture    FAST EXAM: Deferred    In the event of Emergency Blood Transfusion:  Due to the critical condition of this patient, I request the immediate release of blood products for emergency transfusion secondary to shock. I understand the increased risks incurred by the lack of complete transfusion testing.      Radiology:  Chest Xray , Pelvic Xray ,

## 2025-04-18 NOTE — ED NOTES
ED TO INPATIENT SBAR HANDOFF    Patient Name: Leo Guzman   Preferred Name: Leo  : 1970  54 y.o.   Code Status Order: Prior  Telemetry Order: No  C-SSRS:    Sitter no   Restraints:       Situation  No chief complaint on file.    Brief Description of Patient's Condition: trauma alert  Mental Status: oriented and alert    Background  Allergies: No Known Allergies    Assessment  Vitals/MEWS:        Vitals:    25 1501 25 1551 25 1750 25 1811   BP: (!) 173/133 (!) 143/97 (!) 144/82 (!) 142/97   Pulse: 73 79 68 78   Resp: 14 15 17 14   Temp:       SpO2: 94% 98% 94% 95%   Weight:       Height:         Cardiac Rhythm:    Deterioration Index (DI): Deterioration Index: 19.46  Deterioration Index (DI) Interventions Performed:    O2 Flow Rate: O2 Flow Rate (L/min): 15 L/min  O2 Device:      Active Central Lines:                          Active Wounds:    Active Cunningham's:      Recommendation  Patient Belongings:    Additional Comments:   If any further questions, please call Sending RN at [phone]  Opportunity for questions and clarification were provided to (name of person notified and time): Receiving RN at [phone]       Electronically signed by: Electronically signed by Ladan Beltran RN on 2025 at 6:18 PM

## 2025-04-18 NOTE — ED PROVIDER NOTES
Department of Emergency Medicine   ED  Provider Note  Admit Date/RoomTime: 4/18/2025  1:22 PM  ED Room: 5417/5417-A                  HPI:  4/18/25, Time: 1:36 PM EDT  .       Leo Guzman is a 54 y.o. male presenting to the ED as a trauma alert, beginning upon arrival.  Patient had an injury from a branch falling from 30 feet.  He stated that he was trying to cut the tree and it fell on him.  He did lose consciousness.  He is having pain in bilateral elbows, right hip and left lower leg.    Please note, this patient arrived as a Trauma alert and the trauma service assumed the care of this patient on their arrival    Initial evaluation occurred with trauma services at bedside.      This patient’s disposition will be determined by trauma services.      Glascow Coma Scale at time of initial examination  Best Eye Response 4 - Opens eyes on own   Best Verbal Response 5 - Alert and oriented   Best Motor Response 6 - Follows simple motor commands   Total 15       Review of Systems:   A complete review of systems was performed and pertinent positives and negatives are stated within HPI, all other systems reviewed and are negative.              --------------------------------------------- PAST HISTORY ---------------------------------------------  Past Medical History:  has no past medical history on file.    Past Surgical History:  has no past surgical history on file.    Social History:  reports that he has quit smoking. He has never used smokeless tobacco. He reports current alcohol use. He reports that he does not use drugs.    Family History: family history is not on file. Unless otherwise noted, family history is non contributory    The patient’s home medications have been reviewed.    Allergies: Patient has no known allergies.            ------------------------- NURSING NOTES AND VITALS REVIEWED ---------------------------   The nursing notes within the ED encounter and vital signs as below have been reviewed.

## 2025-04-18 NOTE — PROGRESS NOTES
Spiritual Health History and Assessment/Progress Note  [facility]    (P) Crisis, (P) Trauma,  ,      Name: Leo Guzman MRN: [id number]    Age: 54 y.o.     Sex: male   Language: English   Episcopal: None   <principal problem not specified>     Date: 4/18/2025                           Spiritual Assessment began in [facility] EMERGENCY DEPARTMENT        Referral/Consult From: (P) Multi-disciplinary team   Encounter Overview/Reason: (P) Crisis  Service Provided For: (P) Patient    Ann, Belief, Meaning:   Patient identifies as spiritual  Family/Friends No family/friends present      Importance and Influence:  Patient has spiritual/personal beliefs that influence decisions regarding their health  Family/Friends No family/friends present    Community:  Patient feels well-supported. Support system includes: Parent/s and Friends  Family/Friends No family/friends present    Assessment and Plan of Care:     Patient Interventions include: Facilitated expression of thoughts and feelings, Explored spiritual coping/struggle/distress, and Affirmed coping skills/support systems  Family/Friends Interventions include: No family/friends present    Patient Plan of Care: Other: Will follow as needed.  Family/Friends Plan of Care: No family/friends present    Electronically signed by Chaplain Pancho on 4/18/2025 at 5:12 PM

## 2025-04-19 LAB
ANION GAP SERPL CALCULATED.3IONS-SCNC: 11 MMOL/L (ref 7–16)
BASOPHILS # BLD: 0.04 K/UL (ref 0–0.2)
BASOPHILS NFR BLD: 1 % (ref 0–2)
BUN SERPL-MCNC: 11 MG/DL (ref 6–20)
CALCIUM SERPL-MCNC: 8.3 MG/DL (ref 8.6–10)
CHLORIDE SERPL-SCNC: 106 MMOL/L (ref 98–107)
CO2 SERPL-SCNC: 22 MMOL/L (ref 22–29)
CREAT SERPL-MCNC: 1.1 MG/DL (ref 0.7–1.2)
EOSINOPHIL # BLD: 0.14 K/UL (ref 0.05–0.5)
EOSINOPHILS RELATIVE PERCENT: 2 % (ref 0–6)
ERYTHROCYTE [DISTWIDTH] IN BLOOD BY AUTOMATED COUNT: 12.7 % (ref 11.5–15)
GFR, ESTIMATED: 81 ML/MIN/1.73M2
GLUCOSE SERPL-MCNC: 86 MG/DL (ref 74–99)
HCT VFR BLD AUTO: 39 % (ref 37–54)
HGB BLD-MCNC: 13.6 G/DL (ref 12.5–16.5)
IMM GRANULOCYTES # BLD AUTO: 0.03 K/UL (ref 0–0.58)
IMM GRANULOCYTES NFR BLD: 0 % (ref 0–5)
LYMPHOCYTES NFR BLD: 2.23 K/UL (ref 1.5–4)
LYMPHOCYTES RELATIVE PERCENT: 31 % (ref 20–42)
MAGNESIUM SERPL-MCNC: 1.9 MG/DL (ref 1.6–2.6)
MCH RBC QN AUTO: 32.5 PG (ref 26–35)
MCHC RBC AUTO-ENTMCNC: 34.9 G/DL (ref 32–34.5)
MCV RBC AUTO: 93.1 FL (ref 80–99.9)
MONOCYTES NFR BLD: 0.64 K/UL (ref 0.1–0.95)
MONOCYTES NFR BLD: 9 % (ref 2–12)
NEUTROPHILS NFR BLD: 58 % (ref 43–80)
NEUTS SEG NFR BLD: 4.17 K/UL (ref 1.8–7.3)
PLATELET # BLD AUTO: 239 K/UL (ref 130–450)
PMV BLD AUTO: 9.6 FL (ref 7–12)
POTASSIUM SERPL-SCNC: 3.3 MMOL/L (ref 3.5–5.1)
RBC # BLD AUTO: 4.19 M/UL (ref 3.8–5.8)
SODIUM SERPL-SCNC: 140 MMOL/L (ref 136–145)
WBC OTHER # BLD: 7.3 K/UL (ref 4.5–11.5)

## 2025-04-19 PROCEDURE — 1200000000 HC SEMI PRIVATE

## 2025-04-19 PROCEDURE — 99231 SBSQ HOSP IP/OBS SF/LOW 25: CPT | Performed by: SURGERY

## 2025-04-19 PROCEDURE — 6370000000 HC RX 637 (ALT 250 FOR IP): Performed by: SURGERY

## 2025-04-19 PROCEDURE — 6370000000 HC RX 637 (ALT 250 FOR IP)

## 2025-04-19 PROCEDURE — 80048 BASIC METABOLIC PNL TOTAL CA: CPT

## 2025-04-19 PROCEDURE — 97530 THERAPEUTIC ACTIVITIES: CPT

## 2025-04-19 PROCEDURE — 6360000002 HC RX W HCPCS: Performed by: SURGERY

## 2025-04-19 PROCEDURE — 2580000003 HC RX 258

## 2025-04-19 PROCEDURE — 36415 COLL VENOUS BLD VENIPUNCTURE: CPT

## 2025-04-19 PROCEDURE — 85025 COMPLETE CBC W/AUTO DIFF WBC: CPT

## 2025-04-19 PROCEDURE — 83735 ASSAY OF MAGNESIUM: CPT

## 2025-04-19 PROCEDURE — 93005 ELECTROCARDIOGRAM TRACING: CPT | Performed by: SURGERY

## 2025-04-19 PROCEDURE — 97161 PT EVAL LOW COMPLEX 20 MIN: CPT

## 2025-04-19 PROCEDURE — 97165 OT EVAL LOW COMPLEX 30 MIN: CPT

## 2025-04-19 PROCEDURE — 2500000003 HC RX 250 WO HCPCS

## 2025-04-19 RX ORDER — ENOXAPARIN SODIUM 100 MG/ML
30 INJECTION SUBCUTANEOUS 2 TIMES DAILY
Status: DISCONTINUED | OUTPATIENT
Start: 2025-04-19 | End: 2025-04-21 | Stop reason: HOSPADM

## 2025-04-19 RX ADMIN — SODIUM CHLORIDE, PRESERVATIVE FREE 10 ML: 5 INJECTION INTRAVENOUS at 21:53

## 2025-04-19 RX ADMIN — SENNOSIDES AND DOCUSATE SODIUM 1 TABLET: 50; 8.6 TABLET ORAL at 21:47

## 2025-04-19 RX ADMIN — ACETAMINOPHEN 1000 MG: 500 TABLET ORAL at 05:14

## 2025-04-19 RX ADMIN — OXYCODONE HYDROCHLORIDE 10 MG: 10 TABLET ORAL at 21:45

## 2025-04-19 RX ADMIN — ACETAMINOPHEN 1000 MG: 500 TABLET ORAL at 12:54

## 2025-04-19 RX ADMIN — ENOXAPARIN SODIUM 30 MG: 100 INJECTION SUBCUTANEOUS at 15:16

## 2025-04-19 RX ADMIN — SENNOSIDES AND DOCUSATE SODIUM 1 TABLET: 50; 8.6 TABLET ORAL at 08:57

## 2025-04-19 RX ADMIN — METHOCARBAMOL 1000 MG: 500 TABLET ORAL at 08:57

## 2025-04-19 RX ADMIN — METHOCARBAMOL 1000 MG: 500 TABLET ORAL at 17:23

## 2025-04-19 RX ADMIN — OXYCODONE HYDROCHLORIDE 10 MG: 10 TABLET ORAL at 17:23

## 2025-04-19 RX ADMIN — ACETAMINOPHEN 1000 MG: 500 TABLET ORAL at 21:45

## 2025-04-19 RX ADMIN — OXYCODONE HYDROCHLORIDE 10 MG: 10 TABLET ORAL at 08:57

## 2025-04-19 RX ADMIN — SODIUM CHLORIDE: 0.9 INJECTION, SOLUTION INTRAVENOUS at 02:09

## 2025-04-19 RX ADMIN — POTASSIUM BICARBONATE 20 MEQ: 782 TABLET, EFFERVESCENT ORAL at 15:16

## 2025-04-19 RX ADMIN — POLYETHYLENE GLYCOL 3350 17 G: 17 POWDER, FOR SOLUTION ORAL at 08:57

## 2025-04-19 RX ADMIN — SODIUM CHLORIDE, PRESERVATIVE FREE 10 ML: 5 INJECTION INTRAVENOUS at 08:57

## 2025-04-19 RX ADMIN — OXYCODONE HYDROCHLORIDE 10 MG: 10 TABLET ORAL at 12:54

## 2025-04-19 RX ADMIN — METHOCARBAMOL 1000 MG: 500 TABLET ORAL at 12:54

## 2025-04-19 RX ADMIN — METHOCARBAMOL 1000 MG: 500 TABLET ORAL at 21:43

## 2025-04-19 RX ADMIN — OXYCODONE HYDROCHLORIDE 10 MG: 10 TABLET ORAL at 02:12

## 2025-04-19 RX ADMIN — ENOXAPARIN SODIUM 30 MG: 100 INJECTION SUBCUTANEOUS at 21:43

## 2025-04-19 ASSESSMENT — PAIN SCALES - GENERAL
PAINLEVEL_OUTOF10: 3
PAINLEVEL_OUTOF10: 9
PAINLEVEL_OUTOF10: 7
PAINLEVEL_OUTOF10: 4
PAINLEVEL_OUTOF10: 3
PAINLEVEL_OUTOF10: 7
PAINLEVEL_OUTOF10: 7
PAINLEVEL_OUTOF10: 2
PAINLEVEL_OUTOF10: 8
PAINLEVEL_OUTOF10: 5
PAINLEVEL_OUTOF10: 1
PAINLEVEL_OUTOF10: 2
PAINLEVEL_OUTOF10: 7

## 2025-04-19 ASSESSMENT — PAIN DESCRIPTION - PAIN TYPE
TYPE: ACUTE PAIN

## 2025-04-19 ASSESSMENT — PAIN DESCRIPTION - DESCRIPTORS
DESCRIPTORS: ACHING;THROBBING
DESCRIPTORS: ACHING;SHARP
DESCRIPTORS: ACHING;THROBBING;DISCOMFORT
DESCRIPTORS: DISCOMFORT
DESCRIPTORS: ACHING;DISCOMFORT;THROBBING
DESCRIPTORS: ACHING;THROBBING;SHARP
DESCRIPTORS: ACHING;THROBBING;DISCOMFORT

## 2025-04-19 ASSESSMENT — PAIN DESCRIPTION - LOCATION
LOCATION: HEAD;LEG;BACK
LOCATION: BACK;HEAD;LEG
LOCATION: BACK;LEG
LOCATION: BACK
LOCATION: BACK
LOCATION: LEG;HEAD
LOCATION: BACK

## 2025-04-19 ASSESSMENT — PAIN - FUNCTIONAL ASSESSMENT
PAIN_FUNCTIONAL_ASSESSMENT: PREVENTS OR INTERFERES SOME ACTIVE ACTIVITIES AND ADLS
PAIN_FUNCTIONAL_ASSESSMENT: ACTIVITIES ARE NOT PREVENTED
PAIN_FUNCTIONAL_ASSESSMENT: PREVENTS OR INTERFERES SOME ACTIVE ACTIVITIES AND ADLS
PAIN_FUNCTIONAL_ASSESSMENT: ACTIVITIES ARE NOT PREVENTED
PAIN_FUNCTIONAL_ASSESSMENT: PREVENTS OR INTERFERES SOME ACTIVE ACTIVITIES AND ADLS

## 2025-04-19 ASSESSMENT — PAIN DESCRIPTION - ORIENTATION
ORIENTATION: LEFT
ORIENTATION: LEFT;POSTERIOR
ORIENTATION: LEFT
ORIENTATION: LEFT;UPPER

## 2025-04-19 ASSESSMENT — PAIN DESCRIPTION - ONSET
ONSET: ON-GOING
ONSET: GRADUAL
ONSET: ON-GOING
ONSET: ON-GOING

## 2025-04-19 ASSESSMENT — PAIN DESCRIPTION - FREQUENCY
FREQUENCY: INTERMITTENT
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
## 2025-04-19 NOTE — PLAN OF CARE
Problem: Discharge Planning  Goal: Discharge to home or other facility with appropriate resources  Outcome: Progressing  Flowsheets  Taken 4/19/2025 0406  Discharge to home or other facility with appropriate resources: Identify barriers to discharge with patient and caregiver  Taken 4/18/2025 2028  Discharge to home or other facility with appropriate resources: Refer to discharge planning if patient needs post-hospital services based on physician order or complex needs related to functional status, cognitive ability or social support system     Problem: Skin/Tissue Integrity  Goal: Skin integrity remains intact  Description: 1.  Monitor for areas of redness and/or skin breakdown2.  Assess vascular access sites hourly3.  Every 4-6 hours minimum:  Change oxygen saturation probe site4.  Every 4-6 hours:  If on nasal continuous positive airway pressure, respiratory therapy assess nares and determine need for appliance change or resting period  Outcome: Progressing  Flowsheets (Taken 4/18/2025 2028)  Skin Integrity Remains Intact: Monitor for areas of redness and/or skin breakdown     Problem: Safety - Adult  Goal: Free from fall injury  Outcome: Progressing     Problem: ABCDS Injury Assessment  Goal: Absence of physical injury  Outcome: Progressing     Problem: Pain  Goal: Verbalizes/displays adequate comfort level or baseline comfort level  Outcome: Progressing
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
## 2025-04-19 NOTE — DISCHARGE INSTRUCTIONS
TRAUMA SERVICES DISCHARGE INSTRUCTIONS    Call [phone], option 2, for any questions/concerns and for follow-up appointment in 2 week(s).    Please follow the instructions checked below:    Please follow-up with your primary care provider.    ACTIVITY INSTRUCTIONS  Increase activity as tolerated  No heavy lifting or strenuous activity  Take your incentive spirometer home and use 4-6 times/day   [x]  No driving until cleared by trauma service    WOUND/DRESSING INSTRUCTIONS:  You may shower.  No sitting in bath tub, hot tub or swimming until cleared by physician.  Ice to areas of pain for first 24 hours.  Heat to areas of pain after that.  Wash areas of lacerations/abrasions with soap & water.  Rinse well.  Pat dry with clean towel.  Apply thin layer of Bacitracin, Neosporin, or triple antibiotic cream to affected area 2-3 times per day.  Keep wounds clean and dry.   []  Sutures/Staples are to be removed in  week(s).    MEDICATION INSTRUCTIONS  Take medication as prescribed.  When taking pain medications, you may experience dizziness or drowsiness.  Do not drink alcohol or drive when taking these medications.  You may experience constipation while taking pain medication.  You may take over the counter stool softeners such as docusate (Colace), sennosides S (Senokot-S), or Miralax.   []  You may take Ibuprofen (over the counter) as directed for mild pain.     --You may take up to 800mg every 8 hours for pain, please take with food or milk.   [x]  You may take acetaminophen (Tylenol) products.  Do NOT take more than 4000mg of Tylenol in 24h.   [x]  Do not take any other acetaminophen (Tylenol) products if you are taking Percocet or Norco, as these contain Tylenol.   --Do NOT take more than 4000mg of Tylenol in 24h.    OPIOID MEDICATION INSTRUCTIONS  Read the medication guide that is included with your prescription.  Take your medication exactly as prescribed.  Store medication away from children and in a safe

## 2025-04-19 NOTE — DISCHARGE SUMMARY
No fractures.  Degenerative changes thoracic spine.     Atraumatic appearance of the chest.     CT TIBIA FIBULA LEFT WO CONTRAST  Result Date: 4/18/2025  EXAMINATION: CT OF THE LEFT TIBIA AND FIBULA WITHOUT CONTRAST 4/18/2025 12:48 pm TECHNIQUE: CT of the left tibia and fibula was performed without the administration of intravenous contrast.  Multiplanar reformatted images are provided for review. Automated exposure control, iterative reconstruction, and/or weight based adjustment of the mA/kV was utilized to reduce the radiation dose to as low as reasonably achievable. COMPARISON: Same day radiographs HISTORY ORDERING SYSTEM PROVIDED HISTORY: Trauma TECHNOLOGIST PROVIDED HISTORY: Reason for exam:->Trauma Decision Support Exception - unselect if not a suspected or confirmed emergency medical condition->Emergency Medical Condition (MA) What reading provider will be dictating this exam?->CRC FINDINGS: There is no acute fracture or dislocation.  The soft tissues are unremarkable.     No acute bony abnormality.     CT LUMBAR SPINE WO CONTRAST  Result Date: 4/18/2025  EXAMINATION: CT OF THE LUMBAR SPINE WITHOUT CONTRAST  4/18/2025 TECHNIQUE: CT of the lumbar spine was performed without the administration of intravenous contrast. Multiplanar reformatted images are provided for review. Adjustment of mA and/or kV according to patient size was utilized.  Automated exposure control, iterative reconstruction, and/or weight based adjustment of the mA/kV was utilized to reduce the radiation dose to as low as reasonably achievable. COMPARISON: None HISTORY: ORDERING SYSTEM PROVIDED HISTORY: Trauma TECHNOLOGIST PROVIDED HISTORY: Reason for exam:->Trauma Decision Support Exception - unselect if not a suspected or confirmed emergency medical condition->Emergency Medical Condition (MA) What reading provider will be dictating this exam?->CRC FINDINGS: BONES/ALIGNMENT: There is normal alignment of the spine. The vertebral body heights

## 2025-04-19 NOTE — PROGRESS NOTES
4 Eyes Skin Assessment     NAME:  Leo Guzman  YOB: 1970  MEDICAL RECORD NUMBER:  [id number]    The patient is being assessed for  Admission    I agree that at least one RN has performed a thorough Head to Toe Skin Assessment on the patient. ALL assessment sites listed below have been assessed.      Areas assessed by both nurses:    Head, Face, Ears, Shoulders, Back, Chest, Arms, Elbows, Hands, Sacrum. Buttock, Coccyx, Ischium, Legs. Feet and Heels, and Under Medical Devices         Does the Patient have a Wound? No noted wound(s)       Remy Prevention initiated by RN: Yes  Wound Care Orders initiated by RN: No    Pressure Injury (Stage 3,4, Unstageable, DTI, NWPT, and Complex wounds) if present, place Wound referral order by RN under : No    New Ostomies, if present place, Ostomy referral order under : No     Nurse 1 eSignature: Electronically signed by Jane Balderas RN on 4/19/25 at 4:26 AM EDT    **SHARE this note so that the co-signing nurse can place an eSignature**    Nurse 2 eSignature: Electronically signed by Darrell Crespo RN on 4/19/25 at 4:29 AM EDT
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
## 2025-04-19 NOTE — PLAN OF CARE
Problem: Discharge Planning  Goal: Discharge to home or other facility with appropriate resources  4/19/2025 1204 by Diana Gutierrez RN  Outcome: Progressing  4/19/2025 0638 by Jane Balderas, RN  Outcome: Progressing  Flowsheets  Taken 4/19/2025 0406  Discharge to home or other facility with appropriate resources: Identify barriers to discharge with patient and caregiver  Taken 4/18/2025 2028  Discharge to home or other facility with appropriate resources: Refer to discharge planning if patient needs post-hospital services based on physician order or complex needs related to functional status, cognitive ability or social support system     Problem: Skin/Tissue Integrity  Goal: Skin integrity remains intact  Description: 1.  Monitor for areas of redness and/or skin breakdown2.  Assess vascular access sites hourly3.  Every 4-6 hours minimum:  Change oxygen saturation probe site4.  Every 4-6 hours:  If on nasal continuous positive airway pressure, respiratory therapy assess nares and determine need for appliance change or resting period  4/19/2025 1204 by Diana Gutierrez RN  Outcome: Progressing  4/19/2025 0638 by Jane Balderas, RN  Outcome: Progressing  Flowsheets (Taken 4/18/2025 2028)  Skin Integrity Remains Intact: Monitor for areas of redness and/or skin breakdown     Problem: Safety - Adult  Goal: Free from fall injury  4/19/2025 1204 by Diana Gutierrez RN  Outcome: Progressing  4/19/2025 0638 by Jane Balderas, RN  Outcome: Progressing     Problem: ABCDS Injury Assessment  Goal: Absence of physical injury  4/19/2025 1204 by Diana Gutierrez RN  Outcome: Progressing  4/19/2025 0638 by Jane Balderas, RN  Outcome: Progressing     Problem: Pain  Goal: Verbalizes/displays adequate comfort level or baseline comfort level  4/19/2025 1204 by Diana Gutierrez RN  Outcome: Progressing  4/19/2025 0638 by Jane Balderas, RN  Outcome: Progressing
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
## 2025-04-20 LAB
ANION GAP SERPL CALCULATED.3IONS-SCNC: 10 MMOL/L (ref 7–16)
BASOPHILS # BLD: 0.05 K/UL (ref 0–0.2)
BASOPHILS NFR BLD: 1 % (ref 0–2)
BUN SERPL-MCNC: 6 MG/DL (ref 6–20)
CALCIUM SERPL-MCNC: 8.9 MG/DL (ref 8.6–10)
CHLORIDE SERPL-SCNC: 107 MMOL/L (ref 98–107)
CO2 SERPL-SCNC: 23 MMOL/L (ref 22–29)
CREAT SERPL-MCNC: 1 MG/DL (ref 0.7–1.2)
EOSINOPHIL # BLD: 0.22 K/UL (ref 0.05–0.5)
EOSINOPHILS RELATIVE PERCENT: 3 % (ref 0–6)
ERYTHROCYTE [DISTWIDTH] IN BLOOD BY AUTOMATED COUNT: 12.6 % (ref 11.5–15)
GFR, ESTIMATED: 87 ML/MIN/1.73M2
GLUCOSE SERPL-MCNC: 107 MG/DL (ref 74–99)
HCT VFR BLD AUTO: 41.5 % (ref 37–54)
HGB BLD-MCNC: 14.4 G/DL (ref 12.5–16.5)
IMM GRANULOCYTES # BLD AUTO: 0.03 K/UL (ref 0–0.58)
IMM GRANULOCYTES NFR BLD: 0 % (ref 0–5)
LYMPHOCYTES NFR BLD: 1.96 K/UL (ref 1.5–4)
LYMPHOCYTES RELATIVE PERCENT: 27 % (ref 20–42)
MCH RBC QN AUTO: 32.8 PG (ref 26–35)
MCHC RBC AUTO-ENTMCNC: 34.7 G/DL (ref 32–34.5)
MCV RBC AUTO: 94.5 FL (ref 80–99.9)
MONOCYTES NFR BLD: 0.57 K/UL (ref 0.1–0.95)
MONOCYTES NFR BLD: 8 % (ref 2–12)
NEUTROPHILS NFR BLD: 61 % (ref 43–80)
NEUTS SEG NFR BLD: 4.49 K/UL (ref 1.8–7.3)
PLATELET # BLD AUTO: 239 K/UL (ref 130–450)
PMV BLD AUTO: 9.7 FL (ref 7–12)
POTASSIUM SERPL-SCNC: 3.9 MMOL/L (ref 3.5–5.1)
RBC # BLD AUTO: 4.39 M/UL (ref 3.8–5.8)
SODIUM SERPL-SCNC: 140 MMOL/L (ref 136–145)
WBC OTHER # BLD: 7.3 K/UL (ref 4.5–11.5)

## 2025-04-20 PROCEDURE — 36415 COLL VENOUS BLD VENIPUNCTURE: CPT

## 2025-04-20 PROCEDURE — 85025 COMPLETE CBC W/AUTO DIFF WBC: CPT

## 2025-04-20 PROCEDURE — 6360000002 HC RX W HCPCS: Performed by: SURGERY

## 2025-04-20 PROCEDURE — 99231 SBSQ HOSP IP/OBS SF/LOW 25: CPT | Performed by: SURGERY

## 2025-04-20 PROCEDURE — 1200000000 HC SEMI PRIVATE

## 2025-04-20 PROCEDURE — 2500000003 HC RX 250 WO HCPCS

## 2025-04-20 PROCEDURE — 80048 BASIC METABOLIC PNL TOTAL CA: CPT

## 2025-04-20 PROCEDURE — 6370000000 HC RX 637 (ALT 250 FOR IP)

## 2025-04-20 RX ORDER — METHOCARBAMOL 1000 MG/1
1000 TABLET, FILM COATED ORAL 4 TIMES DAILY
Qty: 40 TABLET | Refills: 0 | Status: SHIPPED | OUTPATIENT
Start: 2025-04-20 | End: 2025-04-30

## 2025-04-20 RX ORDER — POLYETHYLENE GLYCOL 3350 17 G/17G
17 POWDER, FOR SOLUTION ORAL DAILY
Qty: 30 PACKET | Refills: 0 | Status: SHIPPED | OUTPATIENT
Start: 2025-04-21 | End: 2025-05-21

## 2025-04-20 RX ADMIN — OXYCODONE HYDROCHLORIDE 10 MG: 10 TABLET ORAL at 05:54

## 2025-04-20 RX ADMIN — ACETAMINOPHEN 1000 MG: 500 TABLET ORAL at 05:53

## 2025-04-20 RX ADMIN — ACETAMINOPHEN 1000 MG: 500 TABLET ORAL at 21:00

## 2025-04-20 RX ADMIN — METHOCARBAMOL 1000 MG: 500 TABLET ORAL at 20:59

## 2025-04-20 RX ADMIN — SODIUM CHLORIDE, PRESERVATIVE FREE 10 ML: 5 INJECTION INTRAVENOUS at 07:40

## 2025-04-20 RX ADMIN — ENOXAPARIN SODIUM 30 MG: 100 INJECTION SUBCUTANEOUS at 07:39

## 2025-04-20 RX ADMIN — METHOCARBAMOL 1000 MG: 500 TABLET ORAL at 07:40

## 2025-04-20 RX ADMIN — SODIUM CHLORIDE, PRESERVATIVE FREE 10 ML: 5 INJECTION INTRAVENOUS at 21:02

## 2025-04-20 RX ADMIN — POLYETHYLENE GLYCOL 3350 17 G: 17 POWDER, FOR SOLUTION ORAL at 07:40

## 2025-04-20 RX ADMIN — ACETAMINOPHEN 1000 MG: 500 TABLET ORAL at 12:29

## 2025-04-20 RX ADMIN — SENNOSIDES AND DOCUSATE SODIUM 1 TABLET: 50; 8.6 TABLET ORAL at 07:39

## 2025-04-20 RX ADMIN — OXYCODONE HYDROCHLORIDE 10 MG: 10 TABLET ORAL at 20:59

## 2025-04-20 RX ADMIN — METHOCARBAMOL 1000 MG: 500 TABLET ORAL at 12:29

## 2025-04-20 RX ADMIN — ENOXAPARIN SODIUM 30 MG: 100 INJECTION SUBCUTANEOUS at 21:00

## 2025-04-20 RX ADMIN — SENNOSIDES AND DOCUSATE SODIUM 1 TABLET: 50; 8.6 TABLET ORAL at 21:00

## 2025-04-20 RX ADMIN — OXYCODONE HYDROCHLORIDE 10 MG: 10 TABLET ORAL at 16:50

## 2025-04-20 RX ADMIN — OXYCODONE HYDROCHLORIDE 10 MG: 10 TABLET ORAL at 12:29

## 2025-04-20 RX ADMIN — METHOCARBAMOL 1000 MG: 500 TABLET ORAL at 16:50

## 2025-04-20 ASSESSMENT — PAIN DESCRIPTION - ORIENTATION
ORIENTATION: LEFT

## 2025-04-20 ASSESSMENT — PAIN - FUNCTIONAL ASSESSMENT
PAIN_FUNCTIONAL_ASSESSMENT: PREVENTS OR INTERFERES SOME ACTIVE ACTIVITIES AND ADLS

## 2025-04-20 ASSESSMENT — PAIN DESCRIPTION - LOCATION
LOCATION: LEG
LOCATION: LEG
LOCATION: BACK;LEG;HEAD
LOCATION: LEG

## 2025-04-20 ASSESSMENT — PAIN DESCRIPTION - DESCRIPTORS
DESCRIPTORS: ACHING;DISCOMFORT;THROBBING
DESCRIPTORS: ACHING;THROBBING;DISCOMFORT
DESCRIPTORS: ACHING;THROBBING;BURNING

## 2025-04-20 ASSESSMENT — PAIN DESCRIPTION - FREQUENCY
FREQUENCY: INTERMITTENT
FREQUENCY: INTERMITTENT

## 2025-04-20 ASSESSMENT — PAIN DESCRIPTION - ONSET
ONSET: ON-GOING
ONSET: ON-GOING

## 2025-04-20 ASSESSMENT — PAIN SCALES - GENERAL
PAINLEVEL_OUTOF10: 8
PAINLEVEL_OUTOF10: 2
PAINLEVEL_OUTOF10: 8
PAINLEVEL_OUTOF10: 8
PAINLEVEL_OUTOF10: 7
PAINLEVEL_OUTOF10: 2
PAINLEVEL_OUTOF10: 2

## 2025-04-20 ASSESSMENT — PAIN DESCRIPTION - PAIN TYPE
TYPE: ACUTE PAIN
TYPE: ACUTE PAIN

## 2025-04-20 NOTE — PLAN OF CARE
Problem: Discharge Planning  Goal: Discharge to home or other facility with appropriate resources  4/20/2025 1949 by Lelia Funes RN  Outcome: Progressing  4/20/2025 1316 by Diana Gutierrez RN  Outcome: Progressing     Problem: Skin/Tissue Integrity  Goal: Skin integrity remains intact  Description: 1.  Monitor for areas of redness and/or skin breakdown2.  Assess vascular access sites hourly3.  Every 4-6 hours minimum:  Change oxygen saturation probe site4.  Every 4-6 hours:  If on nasal continuous positive airway pressure, respiratory therapy assess nares and determine need for appliance change or resting period  4/20/2025 1949 by Lelia Funes RN  Outcome: Progressing  4/20/2025 1316 by Diana Gutierrez RN  Outcome: Progressing     Problem: Safety - Adult  Goal: Free from fall injury  4/20/2025 1949 by Lelia Funes RN  Outcome: Progressing  4/20/2025 1316 by Diana Gutierrez RN  Outcome: Progressing     Problem: ABCDS Injury Assessment  Goal: Absence of physical injury  4/20/2025 1949 by Lelia Funes RN  Outcome: Progressing  4/20/2025 1316 by Diana Gutierrez RN  Outcome: Progressing     Problem: Pain  Goal: Verbalizes/displays adequate comfort level or baseline comfort level  4/20/2025 1949 by Lelia Funes RN  Outcome: Progressing  4/20/2025 1316 by Diana Gutierrez RN  Outcome: Progressing

## 2025-04-20 NOTE — PLAN OF CARE
Problem: Discharge Planning  Goal: Discharge to home or other facility with appropriate resources  4/20/2025 1316 by Diana Gutierrez RN  Outcome: Progressing  4/20/2025 0145 by Jane Balderas RN  Outcome: Progressing  Flowsheets (Taken 4/20/2025 0000)  Discharge to home or other facility with appropriate resources: Arrange for needed discharge resources and transportation as appropriate     Problem: Skin/Tissue Integrity  Goal: Skin integrity remains intact  Description: 1.  Monitor for areas of redness and/or skin breakdown2.  Assess vascular access sites hourly3.  Every 4-6 hours minimum:  Change oxygen saturation probe site4.  Every 4-6 hours:  If on nasal continuous positive airway pressure, respiratory therapy assess nares and determine need for appliance change or resting period  4/20/2025 1316 by Diana Gutierrez RN  Outcome: Progressing  4/20/2025 0145 by Jane Balderas, RN  Outcome: Progressing  Flowsheets (Taken 4/19/2025 1915)  Skin Integrity Remains Intact: Monitor for areas of redness and/or skin breakdown     Problem: Safety - Adult  Goal: Free from fall injury  4/20/2025 1316 by Diana Gutierrez RN  Outcome: Progressing  4/20/2025 0145 by Jane Balderas, RN  Outcome: Progressing     Problem: ABCDS Injury Assessment  Goal: Absence of physical injury  4/20/2025 1316 by Diana Gutierrez RN  Outcome: Progressing  4/20/2025 0145 by Jane Balderas, RN  Outcome: Progressing     Problem: Pain  Goal: Verbalizes/displays adequate comfort level or baseline comfort level  4/20/2025 1316 by Diana Gutierrez RN  Outcome: Progressing  4/20/2025 0145 by Jane Balderas, RN  Outcome: Progressing  Flowsheets (Taken 4/19/2025 1915)  Verbalizes/displays adequate comfort level or baseline comfort level: Encourage patient to monitor pain and request assistance

## 2025-04-20 NOTE — PROGRESS NOTES
GENERAL SURGERY  DAILY PROGRESS NOTE    Patient's Name/Date of Birth: Leo Guzman / 1970    Date: 2025     No chief complaint on file.       Subjective:    Still with some vision difficulty, unchanged from yesterday. Optho saw patient. Hematoma on leg improving.     Objective:  Last 24Hrs  Temp  Av.5 °F (36.4 °C)  Min: 97.4 °F (36.3 °C)  Max: 97.6 °F (36.4 °C)  Resp  Av.8  Min: 16  Max: 18  Pulse  Av  Min: 61  Max: 71  Systolic (24hrs), Av , Min:134 , Max:146     Diastolic (24hrs), Av, Min:97, Max:99    SpO2  Av.5 %  Min: 94 %  Max: 95 %    I/O last 3 completed shifts:  In: 3997 [P.O.:1967; I.V.:]  Out: 1250 [Urine:1250]      General: In no acute distress, alert and oriented x4  Cardiovascular: Warm throughout, no edema  Respiratory: no respiratory distress, equal chest rise  Abdomen: soft,  nontender, nondistended  Skin: no obvious rashes or lesions appreciated, no jaundice  Extremities: LLE hematoma improving, able to wiggle toes, sensation intact, pal DP/PT      CBC  Recent Labs     25  1320 25  0438 25  0424   WBC 8.0 7.3 7.3   RBC 4.82 4.19 4.39   HGB 15.4 13.6 14.4   HCT 43.7 39.0 41.5   MCV 90.7 93.1 94.5   MCH 32.0 32.5 32.8   MCHC 35.2* 34.9* 34.7*   RDW 12.4 12.7 12.6    239 239   MPV 9.3 9.6 9.7       CMP  Recent Labs     25  1320 25  1328 25  0438 25  0424     --  140 140   K 3.8 3.85 3.3* 3.9   *  --  106 107   CO2 21*  --  22 23   BUN 10  --  11 6   CREATININE 1.4*  --  1.1 1.0   GLUCOSE 105*  --  86 107*   CALCIUM 9.1  --  8.3* 8.9   BILITOT 0.5  --   --   --    ALKPHOS 77  --   --   --    AST 36  --   --   --    ALT 28  --   --   --          Assessment/Plan:    Patient Active Problem List   Diagnosis    Blunt trauma    Closed fracture of three ribs, left, initial encounter    Commotio retinae of left eye    Macular retinal edema, left, traumatic    Decreased vision of left eye    Trauma

## 2025-04-20 NOTE — PLAN OF CARE
Problem: Discharge Planning  Goal: Discharge to home or other facility with appropriate resources  4/20/2025 0145 by Jane Balderas, RN  Outcome: Progressing  4/19/2025 1204 by Diana Gutierrez RN  Outcome: Progressing     Problem: Skin/Tissue Integrity  Goal: Skin integrity remains intact  Description: 1.  Monitor for areas of redness and/or skin breakdown2.  Assess vascular access sites hourly3.  Every 4-6 hours minimum:  Change oxygen saturation probe site4.  Every 4-6 hours:  If on nasal continuous positive airway pressure, respiratory therapy assess nares and determine need for appliance change or resting period  4/20/2025 0145 by Jane Balderas, RN  Outcome: Progressing  Flowsheets (Taken 4/19/2025 1915)  Skin Integrity Remains Intact: Monitor for areas of redness and/or skin breakdown  4/19/2025 1204 by Diana Gutierrez RN  Outcome: Progressing     Problem: Safety - Adult  Goal: Free from fall injury  4/20/2025 0145 by Jane Balderas, RN  Outcome: Progressing  4/19/2025 1204 by Diana Gutierrez RN  Outcome: Progressing     Problem: ABCDS Injury Assessment  Goal: Absence of physical injury  4/20/2025 0145 by Jane Balderas RN  Outcome: Progressing  4/19/2025 1204 by Diana Gutierrez RN  Outcome: Progressing     Problem: Pain  Goal: Verbalizes/displays adequate comfort level or baseline comfort level  4/20/2025 0145 by Jane Balderas RN  Outcome: Progressing  Flowsheets (Taken 4/19/2025 1915)  Verbalizes/displays adequate comfort level or baseline comfort level: Encourage patient to monitor pain and request assistance  4/19/2025 1204 by Diana Gutierrez RN  Outcome: Progressing

## 2025-04-21 VITALS
HEIGHT: 73 IN | BODY MASS INDEX: 28.84 KG/M2 | HEART RATE: 78 BPM | WEIGHT: 217.59 LBS | TEMPERATURE: 97.3 F | DIASTOLIC BLOOD PRESSURE: 109 MMHG | SYSTOLIC BLOOD PRESSURE: 154 MMHG | OXYGEN SATURATION: 97 % | RESPIRATION RATE: 16 BRPM

## 2025-04-21 LAB
EKG ATRIAL RATE: 65 BPM
EKG P AXIS: 67 DEGREES
EKG P-R INTERVAL: 154 MS
EKG Q-T INTERVAL: 426 MS
EKG QRS DURATION: 94 MS
EKG QTC CALCULATION (BAZETT): 443 MS
EKG R AXIS: 45 DEGREES
EKG T AXIS: 40 DEGREES
EKG VENTRICULAR RATE: 65 BPM

## 2025-04-21 PROCEDURE — 6360000002 HC RX W HCPCS: Performed by: SURGERY

## 2025-04-21 PROCEDURE — 6370000000 HC RX 637 (ALT 250 FOR IP)

## 2025-04-21 PROCEDURE — 93010 ELECTROCARDIOGRAM REPORT: CPT | Performed by: INTERNAL MEDICINE

## 2025-04-21 PROCEDURE — 97530 THERAPEUTIC ACTIVITIES: CPT

## 2025-04-21 PROCEDURE — 2500000003 HC RX 250 WO HCPCS

## 2025-04-21 RX ADMIN — SODIUM CHLORIDE, PRESERVATIVE FREE 10 ML: 5 INJECTION INTRAVENOUS at 09:14

## 2025-04-21 RX ADMIN — POLYETHYLENE GLYCOL 3350 17 G: 17 POWDER, FOR SOLUTION ORAL at 09:14

## 2025-04-21 RX ADMIN — SENNOSIDES AND DOCUSATE SODIUM 1 TABLET: 50; 8.6 TABLET ORAL at 09:14

## 2025-04-21 RX ADMIN — OXYCODONE HYDROCHLORIDE 10 MG: 10 TABLET ORAL at 05:42

## 2025-04-21 RX ADMIN — METHOCARBAMOL 1000 MG: 500 TABLET ORAL at 09:14

## 2025-04-21 RX ADMIN — ACETAMINOPHEN 1000 MG: 500 TABLET ORAL at 05:42

## 2025-04-21 RX ADMIN — ENOXAPARIN SODIUM 30 MG: 100 INJECTION SUBCUTANEOUS at 09:14

## 2025-04-21 ASSESSMENT — PAIN SCALES - GENERAL
PAINLEVEL_OUTOF10: 0
PAINLEVEL_OUTOF10: 8

## 2025-04-21 NOTE — CARE COORDINATION
4/21/2025social work transition of care planning  Pt is from home alone and had been independent. Pt reported pcp as Dr. Lam and pharmacy is [facility] on [address]. Explained sw role in transition of care planning. Pt plan is home,pt will call for a ride. Pt has no preference for [facility] or dme company. [facility] unable to accept. Referral to [facility]. Referral to [facility] for fww and s/c.  Electronically signed by JAC Denny on 4/21/2025 at 9:56 AM    Addendum: [facility] can accept.  Electronically signed by JAC Denny on 4/21/2025 at 10:22 AM
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
## 2025-04-23 ENCOUNTER — TELEPHONE (OUTPATIENT)
Dept: SURGERY | Age: 55
End: 2025-04-23

## 2025-04-23 NOTE — TELEPHONE ENCOUNTER
MA received call from Key Aleman, home health nurse, stating patient is declining home health at this time. Patient states he is getting around fine, feels great, and is taking his medications as needed. MA verbalized understanding.     MA routing message to Dr. Alcocer and WILL Caballero for informational purposes.     Electronically signed by Amee Paul MA on 4/23/2025 at 4:09 PM

## 2025-04-27 NOTE — PATIENT INSTRUCTIONS
[facility]  Trauma Services  Additional Discharge Instructions    Call [phone] for any questions/concerns.      Please follow the instructions checked below:  During the course of your workup, an incidental finding of thyroid nodule was seen.    Please follow up with your primary care provider.       ACTIVITY INSTRUCTIONS  Increase activity as tolerated  No heavy lifting or strenuous activity   [x]  You may drive.      WOUND/DRESSING INSTRUCTIONS:  You may shower.  No sitting in bath tub, hot tub or swimming until cleared by physician.  Ice to areas of pain for first 24 hours.  Heat to areas of pain after that.  Wash areas of lacerations/abrasions with soap & water.  Rinse well.  Pat dry with clean towel.  Apply thin layer of Bacitracin, Neosporin, or triple antibiotic cream to affected area 2-3 times per day.  Keep wounds clean and dry.    MEDICATION INSTRUCTIONS  Take medication as prescribed.  When taking pain medications, you may experience dizziness or drowsiness.  Do not drink alcohol or drive when taking these medications.  You may experience constipation while taking pain medication.  You may take over the counter stool softeners such as docusate (Colace), sennosides S (Senokot-S), or Miralax.   [x]  You may take Ibuprofen (over the counter) as directed for mild pain.  You may take up to 800 mg every 8 hours for pain, please take with food or milk.   [x]  You may take acetaminophen (Tylenol) products.  Do NOT take more than 4000mg of Tylenol in 24h.    CALL 911 OR YOUR LOCAL EMERGENCY SERVICE:  --If you take too much medication  --If you have trouble breathing or shortness of breath  --A child has taken this medication.    WORK:  You may not return to work until you receive follow-up with the Trauma Clinic or clearance by all consultants.    Call the trauma clinic for any of the following or for questions/concerns;  --fever over 101F  --redness, swelling, hardness or  carried

## 2025-04-29 ENCOUNTER — OFFICE VISIT (OUTPATIENT)
Dept: SURGERY | Age: 55
End: 2025-04-29
Payer: MEDICARE

## 2025-04-29 VITALS
TEMPERATURE: 97.7 F | HEIGHT: 73 IN | HEART RATE: 96 BPM | OXYGEN SATURATION: 95 % | BODY MASS INDEX: 28.79 KG/M2 | DIASTOLIC BLOOD PRESSURE: 87 MMHG | RESPIRATION RATE: 22 BRPM | SYSTOLIC BLOOD PRESSURE: 134 MMHG | WEIGHT: 217.2 LBS

## 2025-04-29 DIAGNOSIS — T14.90XA BLUNT TRAUMA: Primary | ICD-10-CM

## 2025-04-29 DIAGNOSIS — S80.12XD LEG HEMATOMA, LEFT, SUBSEQUENT ENCOUNTER: ICD-10-CM

## 2025-04-29 DIAGNOSIS — H54.62 DECREASED VISION OF LEFT EYE: ICD-10-CM

## 2025-04-29 DIAGNOSIS — M54.2 CERVICAL SPINE PAIN: ICD-10-CM

## 2025-04-29 DIAGNOSIS — S06.0X9D CONCUSSION WITH LOSS OF CONSCIOUSNESS, SUBSEQUENT ENCOUNTER: ICD-10-CM

## 2025-04-29 PROCEDURE — 99213 OFFICE O/P EST LOW 20 MIN: CPT | Performed by: NURSE PRACTITIONER

## 2025-04-29 PROCEDURE — 99212 OFFICE O/P EST SF 10 MIN: CPT | Performed by: NURSE PRACTITIONER

## 2025-04-29 RX ORDER — METHOCARBAMOL 750 MG/1
750 TABLET, FILM COATED ORAL 4 TIMES DAILY
Qty: 40 TABLET | Refills: 0 | Status: SHIPPED | OUTPATIENT
Start: 2025-04-29 | End: 2025-05-09

## 2025-04-29 ASSESSMENT — ENCOUNTER SYMPTOMS: SHORTNESS OF BREATH: 0

## 2025-04-30 ENCOUNTER — TELEPHONE (OUTPATIENT)
Dept: INTERNAL MEDICINE | Age: 55
End: 2025-04-30
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
## 2025-05-13 ENCOUNTER — TELEPHONE (OUTPATIENT)
Dept: SURGERY | Age: 55
End: 2025-05-13
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
## 2025-05-22 ENCOUNTER — TELEPHONE (OUTPATIENT)
Age: 55
End: 2025-05-22
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
## 2025-05-27 ENCOUNTER — TELEPHONE (OUTPATIENT)
Age: 55
End: 2025-05-27

## 2025-05-27 NOTE — TELEPHONE ENCOUNTER
LPN called patient to reschedule missed trauma clinic appointment, per Cherry SIMON. Patient rescheduled for Friday 5/30 @ 9:40am. Patient accepted date and time.     Electronically signed by Garry Martinez LPN on 5/27/25 at 10:40 AM EDT
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
## 2025-05-30 ENCOUNTER — OFFICE VISIT (OUTPATIENT)
Age: 55
End: 2025-05-30
Payer: MEDICARE

## 2025-05-30 ENCOUNTER — TELEPHONE (OUTPATIENT)
Age: 55
End: 2025-05-30

## 2025-05-30 VITALS
TEMPERATURE: 97 F | HEART RATE: 68 BPM | SYSTOLIC BLOOD PRESSURE: 127 MMHG | DIASTOLIC BLOOD PRESSURE: 87 MMHG | WEIGHT: 224 LBS | RESPIRATION RATE: 16 BRPM | HEIGHT: 73 IN | BODY MASS INDEX: 29.69 KG/M2 | OXYGEN SATURATION: 97 %

## 2025-05-30 DIAGNOSIS — S16.1XXD STRAIN OF NECK MUSCLE, SUBSEQUENT ENCOUNTER: ICD-10-CM

## 2025-05-30 DIAGNOSIS — S06.0XAD CONCUSSION WITH UNKNOWN LOSS OF CONSCIOUSNESS STATUS, SUBSEQUENT ENCOUNTER: Primary | ICD-10-CM

## 2025-05-30 DIAGNOSIS — T14.90XA BLUNT TRAUMA: ICD-10-CM

## 2025-05-30 DIAGNOSIS — H54.62 DECREASED VISION OF LEFT EYE: ICD-10-CM

## 2025-05-30 PROCEDURE — 99213 OFFICE O/P EST LOW 20 MIN: CPT | Performed by: NURSE PRACTITIONER

## 2025-05-30 RX ORDER — CYCLOBENZAPRINE HCL 10 MG
10 TABLET ORAL 3 TIMES DAILY PRN
Qty: 21 TABLET | Refills: 0 | Status: SHIPPED | OUTPATIENT
Start: 2025-05-30 | End: 2025-06-09

## 2025-05-30 RX ORDER — IBUPROFEN 600 MG/1
600 TABLET, FILM COATED ORAL 3 TIMES DAILY PRN
Qty: 30 TABLET | Refills: 0 | Status: SHIPPED | OUTPATIENT
Start: 2025-05-30 | End: 2025-05-30

## 2025-05-30 RX ORDER — IBUPROFEN 600 MG/1
600 TABLET, FILM COATED ORAL 3 TIMES DAILY PRN
Qty: 30 TABLET | Refills: 0 | Status: SHIPPED | OUTPATIENT
Start: 2025-05-30

## 2025-05-30 NOTE — TELEPHONE ENCOUNTER
IESHA called and spoke with staff at Dr Cohen office, regarding patient's missed appointment. LPN was able to reschedule appointment for Monday June 2nd @ 11:00am, office asked that patient arrive 15 minutes prior to appointment to fill out paperwork. Instructed patient to bring insurance card, photo ID and list of any current meds. Patient also instructed to bring a , as they will dilate his eyes. This nurse also provided direct contact for Dr Cohen number and address to office, in case he needed to reschedule appointment. Patient accepted date and time.     Electronically signed by Garry Martinez LPN on 5/30/25 at 10:32 AM EDT

## 2025-05-30 NOTE — PATIENT INSTRUCTIONS
Dr Cohen Ophthalmologist appointment : Monday June 3rd @ 11:00am. Arrive at 10:45am. Please have someone drive you, bring Photo ID, Insurance Card and any medication. [address], [place], [address].

## 2025-06-04 ENCOUNTER — HOSPITAL ENCOUNTER (OUTPATIENT)
Dept: PHYSICAL THERAPY | Age: 55
Setting detail: THERAPIES SERIES
Discharge: HOME | End: 2025-06-04

## 2025-06-04 NOTE — PROGRESS NOTES
[facility]                Phone: [phone]  Fax: [phone]    Physical Therapy  Cancellation/No-show Note  Patient Name:  Leo Guzman  :  1970   Date:  2025    For today's appointment patient:  []  Cancelled  []  Rescheduled appointment  [x]  No-show     Reason given by patient:  []  Patient ill  []  Conflicting appointment  []  No transportation    []  Conflict with work  [x]  No reason given  []  Other:     Comments:  pt was a no-show for initial evaluation.       Electronically signed by:  Ashish Lindo PT

## 2025-06-13 ENCOUNTER — TELEPHONE (OUTPATIENT)
Age: 55
End: 2025-06-13

## 2025-06-13 NOTE — TELEPHONE ENCOUNTER
Patient no showed today with NOEMI Wiggins at 10:30 am. Attempted to contact patient to reschedule the appointment, MA left detailed message requesting return call to reschedule appointment.    Electronically signed by Amee Paul MA on 6/13/2025 at 11:13 AM